# Patient Record
Sex: MALE | Race: WHITE | NOT HISPANIC OR LATINO | URBAN - METROPOLITAN AREA
[De-identification: names, ages, dates, MRNs, and addresses within clinical notes are randomized per-mention and may not be internally consistent; named-entity substitution may affect disease eponyms.]

---

## 2024-05-09 ENCOUNTER — OFFICE VISIT (OUTPATIENT)
Dept: PRIMARY CARE | Facility: CLINIC | Age: 64
End: 2024-05-09

## 2024-05-09 ENCOUNTER — LAB (OUTPATIENT)
Dept: LAB | Facility: LAB | Age: 64
End: 2024-05-09

## 2024-05-09 VITALS
SYSTOLIC BLOOD PRESSURE: 128 MMHG | HEIGHT: 73 IN | OXYGEN SATURATION: 97 % | HEART RATE: 82 BPM | DIASTOLIC BLOOD PRESSURE: 80 MMHG | BODY MASS INDEX: 27.3 KG/M2 | WEIGHT: 206 LBS

## 2024-05-09 DIAGNOSIS — E78.1 HYPERTRIGLYCERIDEMIA: Primary | ICD-10-CM

## 2024-05-09 DIAGNOSIS — R73.03 PREDIABETES: ICD-10-CM

## 2024-05-09 DIAGNOSIS — E55.9 VITAMIN D DEFICIENCY: ICD-10-CM

## 2024-05-09 DIAGNOSIS — F17.201 MODERATE TOBACCO USE DISORDER, IN SUSTAINED REMISSION: ICD-10-CM

## 2024-05-09 DIAGNOSIS — M54.9 CHRONIC BACK PAIN, UNSPECIFIED BACK LOCATION, UNSPECIFIED BACK PAIN LATERALITY: ICD-10-CM

## 2024-05-09 DIAGNOSIS — E03.9 HYPOTHYROIDISM, UNSPECIFIED TYPE: ICD-10-CM

## 2024-05-09 DIAGNOSIS — F41.9 ANXIETY: ICD-10-CM

## 2024-05-09 DIAGNOSIS — G89.29 CHRONIC BACK PAIN, UNSPECIFIED BACK LOCATION, UNSPECIFIED BACK PAIN LATERALITY: ICD-10-CM

## 2024-05-09 DIAGNOSIS — E78.2 MIXED HYPERLIPIDEMIA: ICD-10-CM

## 2024-05-09 DIAGNOSIS — I10 PRIMARY HYPERTENSION: Primary | ICD-10-CM

## 2024-05-09 DIAGNOSIS — I10 PRIMARY HYPERTENSION: ICD-10-CM

## 2024-05-09 LAB
ALBUMIN SERPL BCP-MCNC: 4.7 G/DL (ref 3.4–5)
ALP SERPL-CCNC: 54 U/L (ref 33–136)
ALT SERPL W P-5'-P-CCNC: 41 U/L (ref 10–52)
ANION GAP SERPL CALC-SCNC: 13 MMOL/L (ref 10–20)
AST SERPL W P-5'-P-CCNC: 25 U/L (ref 9–39)
BASOPHILS # BLD AUTO: 0.02 X10*3/UL (ref 0–0.1)
BASOPHILS NFR BLD AUTO: 0.3 %
BILIRUB SERPL-MCNC: 0.5 MG/DL (ref 0–1.2)
BUN SERPL-MCNC: 19 MG/DL (ref 6–23)
CALCIUM SERPL-MCNC: 9.7 MG/DL (ref 8.6–10.3)
CHLORIDE SERPL-SCNC: 101 MMOL/L (ref 98–107)
CHOLEST SERPL-MCNC: 235 MG/DL (ref 0–199)
CHOLESTEROL/HDL RATIO: 5.1
CO2 SERPL-SCNC: 29 MMOL/L (ref 21–32)
CREAT SERPL-MCNC: 0.84 MG/DL (ref 0.5–1.3)
EGFRCR SERPLBLD CKD-EPI 2021: >90 ML/MIN/1.73M*2
EOSINOPHIL # BLD AUTO: 0.11 X10*3/UL (ref 0–0.7)
EOSINOPHIL NFR BLD AUTO: 1.9 %
ERYTHROCYTE [DISTWIDTH] IN BLOOD BY AUTOMATED COUNT: 12 % (ref 11.5–14.5)
GLUCOSE SERPL-MCNC: 89 MG/DL (ref 74–99)
HCT VFR BLD AUTO: 44.5 % (ref 41–52)
HDLC SERPL-MCNC: 45.9 MG/DL
HGB BLD-MCNC: 15 G/DL (ref 13.5–17.5)
IMM GRANULOCYTES # BLD AUTO: 0.01 X10*3/UL (ref 0–0.7)
IMM GRANULOCYTES NFR BLD AUTO: 0.2 % (ref 0–0.9)
LDLC SERPL CALC-MCNC: ABNORMAL MG/DL
LYMPHOCYTES # BLD AUTO: 1.57 X10*3/UL (ref 1.2–4.8)
LYMPHOCYTES NFR BLD AUTO: 26.8 %
MCH RBC QN AUTO: 33.3 PG (ref 26–34)
MCHC RBC AUTO-ENTMCNC: 33.7 G/DL (ref 32–36)
MCV RBC AUTO: 99 FL (ref 80–100)
MONOCYTES # BLD AUTO: 0.57 X10*3/UL (ref 0.1–1)
MONOCYTES NFR BLD AUTO: 9.7 %
NEUTROPHILS # BLD AUTO: 3.57 X10*3/UL (ref 1.2–7.7)
NEUTROPHILS NFR BLD AUTO: 61.1 %
NON HDL CHOLESTEROL: 189 MG/DL (ref 0–149)
NRBC BLD-RTO: 0 /100 WBCS (ref 0–0)
PLATELET # BLD AUTO: 194 X10*3/UL (ref 150–450)
POTASSIUM SERPL-SCNC: 4.5 MMOL/L (ref 3.5–5.3)
PROT SERPL-MCNC: 6.9 G/DL (ref 6.4–8.2)
RBC # BLD AUTO: 4.51 X10*6/UL (ref 4.5–5.9)
SODIUM SERPL-SCNC: 138 MMOL/L (ref 136–145)
TRIGL SERPL-MCNC: 449 MG/DL (ref 0–149)
TSH SERPL-ACNC: 1.25 MIU/L (ref 0.44–3.98)
VLDL: ABNORMAL
WBC # BLD AUTO: 5.9 X10*3/UL (ref 4.4–11.3)

## 2024-05-09 PROCEDURE — 85025 COMPLETE CBC W/AUTO DIFF WBC: CPT

## 2024-05-09 PROCEDURE — 80061 LIPID PANEL: CPT

## 2024-05-09 PROCEDURE — 1036F TOBACCO NON-USER: CPT | Performed by: NURSE PRACTITIONER

## 2024-05-09 PROCEDURE — 36415 COLL VENOUS BLD VENIPUNCTURE: CPT

## 2024-05-09 PROCEDURE — 3074F SYST BP LT 130 MM HG: CPT | Performed by: NURSE PRACTITIONER

## 2024-05-09 PROCEDURE — 80053 COMPREHEN METABOLIC PANEL: CPT

## 2024-05-09 PROCEDURE — 83036 HEMOGLOBIN GLYCOSYLATED A1C: CPT

## 2024-05-09 PROCEDURE — 84443 ASSAY THYROID STIM HORMONE: CPT

## 2024-05-09 PROCEDURE — 99204 OFFICE O/P NEW MOD 45 MIN: CPT | Performed by: NURSE PRACTITIONER

## 2024-05-09 PROCEDURE — 82306 VITAMIN D 25 HYDROXY: CPT

## 2024-05-09 PROCEDURE — 3079F DIAST BP 80-89 MM HG: CPT | Performed by: NURSE PRACTITIONER

## 2024-05-09 RX ORDER — TRAMADOL HYDROCHLORIDE AND ACETAMINOPHEN 37.5; 325 MG/1; MG/1
1 TABLET, FILM COATED ORAL EVERY 6 HOURS PRN
COMMUNITY
Start: 2024-02-02

## 2024-05-09 RX ORDER — IBUPROFEN 600 MG/1
600 TABLET ORAL EVERY 6 HOURS PRN
COMMUNITY
Start: 2023-06-14

## 2024-05-09 RX ORDER — LEVOTHYROXINE SODIUM 88 UG/1
TABLET ORAL
Qty: 118 TABLET | Refills: 3 | Status: SHIPPED | OUTPATIENT
Start: 2024-05-09 | End: 2024-08-07

## 2024-05-09 RX ORDER — HYDROXYZINE HYDROCHLORIDE 25 MG/1
25 TABLET, FILM COATED ORAL 2 TIMES DAILY
Qty: 60 TABLET | Refills: 0 | Status: SHIPPED | OUTPATIENT
Start: 2024-05-09 | End: 2024-05-09

## 2024-05-09 RX ORDER — LISINOPRIL 20 MG/1
1 TABLET ORAL DAILY
COMMUNITY
Start: 2024-03-02 | End: 2024-05-09 | Stop reason: SDUPTHER

## 2024-05-09 RX ORDER — LEVOTHYROXINE SODIUM 88 UG/1
TABLET ORAL
COMMUNITY
Start: 2024-02-29 | End: 2024-05-09 | Stop reason: SDUPTHER

## 2024-05-09 RX ORDER — LISINOPRIL 20 MG/1
20 TABLET ORAL DAILY
Qty: 90 TABLET | Refills: 3 | Status: SHIPPED | OUTPATIENT
Start: 2024-05-09

## 2024-05-09 RX ORDER — HYDROXYZINE HYDROCHLORIDE 25 MG/1
25 TABLET, FILM COATED ORAL EVERY 12 HOURS PRN
Qty: 60 TABLET | Refills: 0 | Status: SHIPPED | OUTPATIENT
Start: 2024-05-09 | End: 2024-06-08

## 2024-05-09 ASSESSMENT — ENCOUNTER SYMPTOMS
SHORTNESS OF BREATH: 0
MYALGIAS: 0
CONSTIPATION: 0
COUGH: 0
JOINT SWELLING: 0
COLOR CHANGE: 0
EYE PAIN: 0
DIARRHEA: 0
ARTHRALGIAS: 1
NAUSEA: 0
CHILLS: 0
PALPITATIONS: 0
WEAKNESS: 0
FEVER: 0
ABDOMINAL PAIN: 0
ABDOMINAL DISTENTION: 0
FATIGUE: 0
BRUISES/BLEEDS EASILY: 0
DIZZINESS: 0
TROUBLE SWALLOWING: 0
DIFFICULTY URINATING: 0
WOUND: 0
HEADACHES: 0
WHEEZING: 0
BACK PAIN: 1
SEIZURES: 0
ADENOPATHY: 0

## 2024-05-09 ASSESSMENT — ANXIETY QUESTIONNAIRES
1. FEELING NERVOUS, ANXIOUS, OR ON EDGE: MORE THAN HALF THE DAYS
4. TROUBLE RELAXING: NEARLY EVERY DAY
7. FEELING AFRAID AS IF SOMETHING AWFUL MIGHT HAPPEN: MORE THAN HALF THE DAYS
2. NOT BEING ABLE TO STOP OR CONTROL WORRYING: MORE THAN HALF THE DAYS
GAD7 TOTAL SCORE: 14
3. WORRYING TOO MUCH ABOUT DIFFERENT THINGS: MORE THAN HALF THE DAYS
IF YOU CHECKED OFF ANY PROBLEMS ON THIS QUESTIONNAIRE, HOW DIFFICULT HAVE THESE PROBLEMS MADE IT FOR YOU TO DO YOUR WORK, TAKE CARE OF THINGS AT HOME, OR GET ALONG WITH OTHER PEOPLE: SOMEWHAT DIFFICULT
6. BECOMING EASILY ANNOYED OR IRRITABLE: MORE THAN HALF THE DAYS
5. BEING SO RESTLESS THAT IT IS HARD TO SIT STILL: SEVERAL DAYS

## 2024-05-09 ASSESSMENT — COLUMBIA-SUICIDE SEVERITY RATING SCALE - C-SSRS
6. HAVE YOU EVER DONE ANYTHING, STARTED TO DO ANYTHING, OR PREPARED TO DO ANYTHING TO END YOUR LIFE?: NO
2. HAVE YOU ACTUALLY HAD ANY THOUGHTS OF KILLING YOURSELF?: NO
1. IN THE PAST MONTH, HAVE YOU WISHED YOU WERE DEAD OR WISHED YOU COULD GO TO SLEEP AND NOT WAKE UP?: NO

## 2024-05-09 ASSESSMENT — PATIENT HEALTH QUESTIONNAIRE - PHQ9
SUM OF ALL RESPONSES TO PHQ9 QUESTIONS 1 AND 2: 0
1. LITTLE INTEREST OR PLEASURE IN DOING THINGS: NOT AT ALL
1. LITTLE INTEREST OR PLEASURE IN DOING THINGS: NOT AT ALL
10. IF YOU CHECKED OFF ANY PROBLEMS, HOW DIFFICULT HAVE THESE PROBLEMS MADE IT FOR YOU TO DO YOUR WORK, TAKE CARE OF THINGS AT HOME, OR GET ALONG WITH OTHER PEOPLE: NOT DIFFICULT AT ALL
SUM OF ALL RESPONSES TO PHQ9 QUESTIONS 1 AND 2: 1
2. FEELING DOWN, DEPRESSED OR HOPELESS: NOT AT ALL
2. FEELING DOWN, DEPRESSED OR HOPELESS: SEVERAL DAYS

## 2024-05-09 NOTE — ASSESSMENT & PLAN NOTE
Patient has questionable symptoms to elevated blood pressure at times? He is to continue current lisinopril dosing but also monitor his blood pressure routinely. Provider to be notified of any issues with persistent hypo or hyper tension

## 2024-05-09 NOTE — PROGRESS NOTES
"Subjective   Patient ID: Evan Wang is a 63 y.o. male who presents for Establish Care and Follow-up (Med review/).    Patient seen today in order to establish primary care.  Patient seen sitting up in room, appearing sad and overwhelmed.  Patient recently retired in January of this year from being a .  He also found out that month that his wife has oral cancer and she has had extensive treatments and surgeries for this.  Patient admits to increased anxiety over his wife's illness and pressures to take care of his family.  Patient has two children at home and is his wife's primary caregiver.  Patient states he has a good support system with other family members but that \"they can't do everything\". HORACIO 7 screening completed today and patient scored for moderate anxiety.  Patient denies any overt issues with depression but does admit to occasional insomnia.  He states that he occasionally smokes marijuana to help with his mood, but states that this is done relatively infrequently.  Patient is agreeable to trial as needed medications with anxiety as well as speak with a counselor.  Patient denies any issues with appetite or staying hydrated.  Occasional nocturia and reported but this typically less than twice at night.  No reported issue with bowel movements.  His most recent colonoscopy was completed in 2022.  Patient takes medication for blood pressure and does not monitor his vital signs at home.  He admits to occasional issues of blurred vision when he gets very upset.  He denies any associated shortness of breath or chest pain.  Patient no longer smokes tobacco but admits to drinking several beers a night.  He states that it is not an excessive amount.  Given patient's former tobacco use and history of being a , he would like imaging completed to assess for any underlying pulmonary pathology.  Patient admits to intermittent issues with lower back pain.  He states that this was a Worker's " "Compensation case and was previously receiving tramadol.  Patient states that he has some of this medication left and takes it very sparingly.  He also admits to occasional aches and pains in various joints if he is overdoing things.  Medications reviewed.  No other acute concerns voiced at this time.           Past Medical History:   Diagnosis Date    Hypertension         History reviewed. No pertinent surgical history.     Family History   Problem Relation Name Age of Onset    Stomach cancer Mother      Lung cancer Father          Review of Systems   Constitutional:  Negative for chills, fatigue and fever.   HENT:  Positive for hearing loss. Negative for dental problem and trouble swallowing.    Eyes:  Positive for visual disturbance. Negative for pain.        Positive for intermittent blurred vision   Respiratory:  Negative for cough, shortness of breath and wheezing.    Cardiovascular:  Negative for chest pain, palpitations and leg swelling.   Gastrointestinal:  Negative for abdominal distention, abdominal pain, constipation, diarrhea and nausea.   Endocrine: Negative for cold intolerance and heat intolerance.   Genitourinary:  Negative for difficulty urinating.        Positive for occasional nocturia   Musculoskeletal:  Positive for arthralgias and back pain. Negative for gait problem, joint swelling and myalgias.        Positive for polyarthralgias   Skin:  Negative for color change, pallor, rash and wound.   Allergic/Immunologic: Negative for environmental allergies and food allergies.   Neurological:  Negative for dizziness, seizures, weakness and headaches.   Hematological:  Negative for adenopathy. Does not bruise/bleed easily.   Psychiatric/Behavioral:  Negative for behavioral problems.         Positive for anxiety and occasional insomnia   All other systems reviewed and are negative.      Objective   /80   Pulse 82   Ht 1.854 m (6' 1\")   Wt 93.4 kg (206 lb)   SpO2 97%   BMI 27.18 kg/m² "     Physical Exam  Constitutional:       General: He is not in acute distress.     Appearance: He is not toxic-appearing.      Comments: Appears run down   HENT:      Head: Normocephalic and atraumatic.      Right Ear: Tympanic membrane, ear canal and external ear normal.      Left Ear: Tympanic membrane, ear canal and external ear normal.      Nose: Nose normal.      Mouth/Throat:      Mouth: Mucous membranes are moist.      Pharynx: Oropharynx is clear.   Eyes:      Extraocular Movements: Extraocular movements intact.      Conjunctiva/sclera: Conjunctivae normal.      Pupils: Pupils are equal, round, and reactive to light.   Cardiovascular:      Rate and Rhythm: Normal rate and regular rhythm.      Pulses: Normal pulses.      Heart sounds: Normal heart sounds. No murmur heard.  Pulmonary:      Effort: Pulmonary effort is normal.      Breath sounds: Normal breath sounds. No wheezing.   Abdominal:      General: Bowel sounds are normal.      Palpations: Abdomen is soft.   Musculoskeletal:         General: No swelling. Normal range of motion.      Cervical back: Normal range of motion and neck supple.   Skin:     General: Skin is warm and dry.   Neurological:      General: No focal deficit present.      Mental Status: He is alert. Mental status is at baseline.      Cranial Nerves: No cranial nerve deficit.      Motor: No weakness.   Psychiatric:         Behavior: Behavior normal.         Thought Content: Thought content normal.         Judgment: Judgment normal.      Comments: Appears sad and overwhelmed at today's visit         Assessment/Plan   Problem List Items Addressed This Visit             ICD-10-CM    Moderate tobacco use disorder, in sustained remission F17.201     Patient is a former smoker and former . He is agreeable to obtain a low dose CT Lung for screening purposes         Relevant Orders    CT lung screening low dose    Anxiety F41.9     Patient agreeable to trial PRN hydroxyzine. He is  also agreeable to speak with in office counselor due to continued issues with anxiety. Patient to notify provider for any persistent or worsening mood concerns         Relevant Medications    hydrOXYzine HCL (Atarax) 25 mg tablet    Other Relevant Orders    Follow Up In [facility]    Vitamin D deficiency E55.9    Relevant Orders    Vitamin D 25-Hydroxy,Total (for eval of Vitamin D levels)    Prediabetes R73.03     Lab Results   Component Value Date    HGBA1C 5.7 (H) 08/09/2023      A1C ordered today for monitoring purposes.         Relevant Orders    Hemoglobin A1C    Hypothyroidism E03.9     Patient continues on daily levothyroxine. TSH levels ordered today for monitoring purposes         Relevant Medications    levothyroxine (Synthroid, Levoxyl) 88 mcg tablet    Other Relevant Orders    Tsh With Reflex To Free T4 If Abnormal    Primary hypertension - Primary I10     Patient has questionable symptoms to elevated blood pressure at times? He is to continue current lisinopril dosing but also monitor his blood pressure routinely. Provider to be notified of any issues with persistent hypo or hyper tension         Relevant Medications    lisinopril 20 mg tablet    Other Relevant Orders    CBC and Auto Differential    Comprehensive Metabolic Panel    Lipid Panel    Chronic back pain M54.9, G89.29     Consider physical therapy for any flares.  Comfort measures and supportive care at this time.

## 2024-05-09 NOTE — ASSESSMENT & PLAN NOTE
Patient is a former smoker and former . He is agreeable to obtain a low dose CT Lung for screening purposes

## 2024-05-09 NOTE — ASSESSMENT & PLAN NOTE
Patient agreeable to trial PRN hydroxyzine. He is also agreeable to speak with in office counselor due to continued issues with anxiety. Patient to notify provider for any persistent or worsening mood concerns

## 2024-05-09 NOTE — ASSESSMENT & PLAN NOTE
Lab Results   Component Value Date    HGBA1C 5.7 (H) 08/09/2023      A1C ordered today for monitoring purposes.

## 2024-05-10 LAB
25(OH)D3 SERPL-MCNC: 28 NG/ML (ref 30–100)
EST. AVERAGE GLUCOSE BLD GHB EST-MCNC: 108 MG/DL
HBA1C MFR BLD: 5.4 %

## 2024-05-10 RX ORDER — ACETAMINOPHEN 500 MG
2000 TABLET ORAL DAILY
Qty: 90 CAPSULE | Refills: 3 | Status: SHIPPED | OUTPATIENT
Start: 2024-05-10 | End: 2025-05-10

## 2024-05-10 RX ORDER — ICOSAPENT ETHYL 1 G/1
2 CAPSULE ORAL
Qty: 360 CAPSULE | Refills: 3 | Status: SHIPPED | OUTPATIENT
Start: 2024-05-10 | End: 2025-05-10

## 2024-05-13 ENCOUNTER — TELEPHONE (OUTPATIENT)
Dept: PRIMARY CARE | Facility: CLINIC | Age: 64
End: 2024-05-13

## 2024-05-13 NOTE — TELEPHONE ENCOUNTER
Result Communication    Resulted Orders   CBC and Auto Differential   Result Value Ref Range    WBC 5.9 4.4 - 11.3 x10*3/uL    nRBC 0.0 0.0 - 0.0 /100 WBCs    RBC 4.51 4.50 - 5.90 x10*6/uL    Hemoglobin 15.0 13.5 - 17.5 g/dL    Hematocrit 44.5 41.0 - 52.0 %    MCV 99 80 - 100 fL    MCH 33.3 26.0 - 34.0 pg    MCHC 33.7 32.0 - 36.0 g/dL    RDW 12.0 11.5 - 14.5 %    Platelets 194 150 - 450 x10*3/uL    Neutrophils % 61.1 40.0 - 80.0 %    Immature Granulocytes %, Automated 0.2 0.0 - 0.9 %      Comment:      Immature Granulocyte Count (IG) includes promyelocytes, myelocytes and metamyelocytes but does not include bands. Percent differential counts (%) should be interpreted in the context of the absolute cell counts (cells/UL).    Lymphocytes % 26.8 13.0 - 44.0 %    Monocytes % 9.7 2.0 - 10.0 %    Eosinophils % 1.9 0.0 - 6.0 %    Basophils % 0.3 0.0 - 2.0 %    Neutrophils Absolute 3.57 1.20 - 7.70 x10*3/uL      Comment:      Percent differential counts (%) should be interpreted in the context of the absolute cell counts (cells/uL).    Immature Granulocytes Absolute, Automated 0.01 0.00 - 0.70 x10*3/uL    Lymphocytes Absolute 1.57 1.20 - 4.80 x10*3/uL    Monocytes Absolute 0.57 0.10 - 1.00 x10*3/uL    Eosinophils Absolute 0.11 0.00 - 0.70 x10*3/uL    Basophils Absolute 0.02 0.00 - 0.10 x10*3/uL   Comprehensive Metabolic Panel   Result Value Ref Range    Glucose 89 74 - 99 mg/dL    Sodium 138 136 - 145 mmol/L    Potassium 4.5 3.5 - 5.3 mmol/L    Chloride 101 98 - 107 mmol/L    Bicarbonate 29 21 - 32 mmol/L    Anion Gap 13 10 - 20 mmol/L    Urea Nitrogen 19 6 - 23 mg/dL    Creatinine 0.84 0.50 - 1.30 mg/dL    eGFR >90 >60 mL/min/1.73m*2      Comment:      Calculations of estimated GFR are performed using the 2021 CKD-EPI Study Refit equation without the race variable for the IDMS-Traceable creatinine methods.  https://jasn.asnjournals.org/content/early/2021/09/22/ASN.6699495011    Calcium 9.7 8.6 - 10.3 mg/dL    Albumin 4.7  3.4 - 5.0 g/dL    Alkaline Phosphatase 54 33 - 136 U/L    Total Protein 6.9 6.4 - 8.2 g/dL    AST 25 9 - 39 U/L    Bilirubin, Total 0.5 0.0 - 1.2 mg/dL    ALT 41 10 - 52 U/L      Comment:      Patients treated with Sulfasalazine may generate falsely decreased results for ALT.   Lipid Panel   Result Value Ref Range    Cholesterol 235 (H) 0 - 199 mg/dL      Comment:            Age      Desirable   Borderline High   High     0-19 Y     0 - 169       170 - 199     >/= 200    20-24 Y     0 - 189       190 - 224     >/= 225         >24 Y     0 - 199       200 - 239     >/= 240   **All ranges are based on fasting samples. Specific   therapeutic targets will vary based on patient-specific   cardiac risk.    Pediatric guidelines reference:Pediatrics 2011, 128(S5).Adult guidelines reference: NCEP ATPIII Guidelines,SON 2001, 258:2486-97    Venipuncture immediately after or during the administration of Metamizole may lead to falsely low results. Testing should be performed immediately prior to Metamizole dosing.    HDL-Cholesterol 45.9 mg/dL      Comment:        Age       Very Low   Low     Normal    High    0-19 Y    < 35      < 40     40-45     ----  20-24 Y    ----     < 40      >45      ----        >24 Y      ----     < 40     40-60      >60      Cholesterol/HDL Ratio 5.1       Comment:        Ref Values  Desirable  < 3.4  High Risk  > 5.0    LDL Calculated        Comment:      The calculation of LDL and VLDL are inaccurate when the Triglycerides are greater than 400 mg/dL or when the patient is non-fasting. If LDL measurement is necessary contact the testing laboratory for an alternative LDL assay.                                  Near   Borderline      AGE      Desirable  Optimal    High     High     Very High     0-19 Y     0 - 109     ---    110-129   >/= 130     ----    20-24 Y     0 - 119     ---    120-159   >/= 160     ----      >24 Y     0 -  99   100-129  130-159   160-189     >/=190      VLDL        Comment:       Unable to calculate VLDL.    Triglycerides 449 (H) 0 - 149 mg/dL      Comment:         Age         Desirable   Borderline High   High     Very High   0 D-90 D    19 - 174         ----         ----        ----  91 D- 9 Y     0 -  74        75 -  99     >/= 100      ----    10-19 Y     0 -  89        90 - 129     >/= 130      ----    20-24 Y     0 - 114       115 - 149     >/= 150      ----         >24 Y     0 - 149       150 - 199    200- 499    >/= 500    Venipuncture immediately after or during the administration of Metamizole may lead to falsely low results. Testing should be performed immediately prior to Metamizole dosing.    Non HDL Cholesterol 189 (H) 0 - 149 mg/dL      Comment:            Age       Desirable   Borderline High   High     Very High     0-19 Y     0 - 119       120 - 144     >/= 145    >/= 160    20-24 Y     0 - 149       150 - 189     >/= 190      ----         >24 Y    30 mg/dL above LDL Cholesterol goal     Hemoglobin A1C   Result Value Ref Range    Hemoglobin A1C 5.4 see below %    Estimated Average Glucose 108 Not Established mg/dL    Narrative    Diagnosis of Diabetes-Adults  Non-Diabetic: < or = 5.6%  Increased risk for developing diabetes: 5.7-6.4%  Diagnostic of diabetes: > or = 6.5%    Monitoring of Diabetes  Age (y)....................... Therapeutic Goal (%)  Adults: >18.........................<7.0  Pediatrics: 13-18...................<7.5  Pediatrics: 7-12....................<8.0  Pediatrics: 0-6..................... 7.5-8.5    American Diabetes Association. Diabetes Care 33(S1), Jan 2010       Vitamin D 25-Hydroxy,Total (for eval of Vitamin D levels)   Result Value Ref Range    Vitamin D, 25-Hydroxy, Total 28 (L) 30 - 100 ng/mL    Narrative    Deficiency:         < 20   ng/ml  Insufficiency:      20-29  ng/ml  Sufficiency:         ng/ml  This assay accurately quantifies the sum of Vitamin D3, 25-Hydroxy and Vitamin D2,25-Hydroxy.   Tsh With Reflex To Free T4 If Abnormal    Result Value Ref Range    Thyroid Stimulating Hormone 1.25 0.44 - 3.98 mIU/L    Narrative    TSH testing is performed using different testing methodology at [facility] than at other [facility] hospitals. Direct result comparisons should only be made within the same method.         11:17 AM      Results were successfully communicated with the patient and they acknowledged their understanding.

## 2024-05-13 NOTE — TELEPHONE ENCOUNTER
----- Message from HAVEN Werner sent at 5/10/2024 11:23 AM EDT -----  Can you please update patient to most recent lab results:  Most recent labs reviewed and found to be relatively stable. Your vitamin D levels were found to be insufficient so a daily vitamin D has been prescribed. There levels will continue to be monitored routinely. Additionally your cholesterol levels were found to be slightly elevated and your triglyceride levels were found to be moderately elevated. You have been prescribed a prescription strength Omega 3 to help bring your levels down. Please follow a low fat, low cholesterol diet and increase your activity as tolerated. Orders in place to recheck these levels in 3 months. Please notify the office if you have any additional questions or concerns.

## 2024-05-23 ENCOUNTER — TELEPHONE (OUTPATIENT)
Dept: PRIMARY CARE | Facility: CLINIC | Age: 64
End: 2024-05-23

## 2024-05-23 DIAGNOSIS — E78.1 HYPERTRIGLYCERIDEMIA: Primary | ICD-10-CM

## 2024-05-23 NOTE — TELEPHONE ENCOUNTER
Can you please update the patient that the Vascepa which was prescribed for his elevated triglyceride levels will not be covered by his insurance so an alternative medication has been sent to his pharmacy.  This should also help to decrease his triglyceride levels.  We will we continue to monitor his levels routinely.

## 2024-05-28 ENCOUNTER — APPOINTMENT (OUTPATIENT)
Dept: RADIOLOGY | Facility: HOSPITAL | Age: 64
End: 2024-05-28
Payer: COMMERCIAL

## 2024-06-03 ENCOUNTER — HOSPITAL ENCOUNTER (OUTPATIENT)
Dept: RADIOLOGY | Facility: HOSPITAL | Age: 64
Discharge: HOME | End: 2024-06-03
Payer: MEDICARE

## 2024-06-03 DIAGNOSIS — F17.201 MODERATE TOBACCO USE DISORDER, IN SUSTAINED REMISSION: ICD-10-CM

## 2024-06-03 PROCEDURE — 71271 CT THORAX LUNG CANCER SCR C-: CPT

## 2024-06-04 ENCOUNTER — SOCIAL WORK (OUTPATIENT)
Dept: PRIMARY CARE | Facility: CLINIC | Age: 64
End: 2024-06-04
Payer: MEDICARE

## 2024-06-06 ENCOUNTER — DOCUMENTATION (OUTPATIENT)
Dept: BEHAVIORAL HEALTH | Facility: CLINIC | Age: 64
End: 2024-06-06
Payer: MEDICARE

## 2024-06-06 ENCOUNTER — TELEPHONE (OUTPATIENT)
Dept: PRIMARY CARE | Facility: CLINIC | Age: 64
End: 2024-06-06
Payer: MEDICARE

## 2024-06-06 NOTE — TELEPHONE ENCOUNTER
Result Communication    Resulted Orders   CT lung screening low dose    Narrative    Interpreted By:  Min Pro,   STUDY:  CT LUNG SCREENING LOW DOSE;  6/3/2024 4:09 pm      INDICATION:  Signs/Symptoms:Lung Cancer Screening.      COMPARISON:  None.      ACCESSION NUMBER(S):  [id number]      ORDERING CLINICIAN:  DION JAFFE      TECHNIQUE:  Helical data acquisition of the chest was obtained without IV  contrast material.  Images were reformatted in axial, coronal, and  sagittal planes.      FINDINGS:  LUNGS AND AIRWAYS:  The trachea and central airways are patent. No endobronchial lesion  is seen.      There is mild bilateral upper lung predominant centrilobular and  paraseptal emphysema.There is no focal consolidation, pleural  effusion, or pneumothorax.      No suspicious pulmonary nodule      MEDIASTINUM AND OVI, LOWER NECK AND AXILLA:  The visualized thyroid gland is within normal limits.  No evidence of thoracic lymphadenopathy by CT criteria.  Esophagus appears within normal limits as seen.      HEART AND VESSELS:  The thoracic aorta normal in course and caliber.  Main pulmonary artery and its branches are normal in caliber.  Minimal coronary artery calcifications are seen. Please note, the  study is not optimized for evaluation of coronary arteries. The  cardiac chambers are not enlarged. There is no pericardial effusion  seen.      UPPER ABDOMEN:  Diffuse hepatic steatosis              CHEST WALL AND OSSEOUS STRUCTURES:  Chest wall is within normal limits.  No acute osseous pathology.There are no suspicious osseous lesions.        Impression    1. No suspicious pulmonary nodules identified. Continued screening  with low-dose noncontrast chest CT in 12 months (from current date)  is recommended.  2. Mild upper lung emphysema  3. Minimal coronary artery calcification.  4. Diffuse hepatic steatosis. Consider PCP evaluation.          LUNG RADS CATEGORY:  Lung Rad: Lung-RADS 1 (Negative)       Recommendation: Continue annual screening with Low Dose Chest CT in  12 months, recommended as per American College of Radiology  Guidelines Lung-RADS Version 2022.          MACRO:  None      Signed by: Min Padilla 6/4/2024 6:47 AM  Dictation workstation:   [id number]       6:11 PM      Results were successfully communicated with the patient and they acknowledged their understanding.

## 2024-06-06 NOTE — PROGRESS NOTES
[facility] Psychiatry Consult Note     Evan Wang is a 63 y.o. y.o., referred to [facility] for symptoms of depression and anxiety. I have reviewed the patient with the behavioral health manager and reviewed the patient's electronic record.    HORACIO - 9  PHQ- 5    Evan is a retired [employer] , retired in January after decades of work.  His wife was diagnosed with tongue cancer and is undergoing care.  This has been difficult, but she should pull through.  Evan has struggled more with sleep.  He has felt anxious for years, but with his wife's struggles he has been more anxious.  He tried hydroxyzine for sleep, but felt it knocked him out.  He denies any depression or SI.  He enjoys performing music.    They have two younger kids at home.  He had used alcohol more in the past, but denies current use.  Trauma is denied.  He has a brother who is supportive.  He has an adult son who has a complicated relationship with Evan's wife (step-mom).      Recommendations:   - Trial Lexapro 10mg daily for anxiety.  Common side effects include upset stomach and headache, but resolve after a few days on it.  Sexual side effects are somewhat common and dose change may be needed.    - Continue hydroxyzine for prn sleep, may take half tab if still too strong  - Patient will continue to follow with behavioral health case management.    The above treatment considerations and suggestions are based on consultations with the patient's care manager and a review of information available in the electronic medical record. I have not personally examined the patient. All recommendations should be implemented with consideration of the patient's relevant prior history and current clinical status. Please feel free to contact me with any questions about the care of this patient. I can be reached via the [facility] or Epic/[employer] messenger.

## 2024-06-06 NOTE — TELEPHONE ENCOUNTER
"----- Message from Camilo Porras DO sent at 6/4/2024  6:18 PM EDT -----  Please make sure patient is aware of the comments or MyChart message.    Your low-dose CT of the chest report for lung cancer screening describes \"no suspicious pulmonary nodules.\"  Recommend continued annual low-dose CT for lung cancer screening.  The radiologist also describes mild emphysema in the upper lung.    Also describes minimal coronary artery calcifications.  Recommend a heart healthy diet and lifestyle.  A statin might be considered.    Also describes fatty liver changes.    Please follow-up with Frances Yo CNP regarding these findings.  "

## 2024-06-07 ASSESSMENT — PATIENT HEALTH QUESTIONNAIRE - PHQ9
5. POOR APPETITE OR OVEREATING: NOT AT ALL
9. THOUGHTS THAT YOU WOULD BE BETTER OFF DEAD, OR OF HURTING YOURSELF: NOT AT ALL
8. MOVING OR SPEAKING SO SLOWLY THAT OTHER PEOPLE COULD HAVE NOTICED. OR THE OPPOSITE, BEING SO FIGETY OR RESTLESS THAT YOU HAVE BEEN MOVING AROUND A LOT MORE THAN USUAL: NOT AT ALL
6. FEELING BAD ABOUT YOURSELF - OR THAT YOU ARE A FAILURE OR HAVE LET YOURSELF OR YOUR FAMILY DOWN: NOT AT ALL
2. FEELING DOWN, DEPRESSED OR HOPELESS: NOT AT ALL
3. TROUBLE FALLING OR STAYING ASLEEP: NEARLY EVERY DAY
10. IF YOU CHECKED OFF ANY PROBLEMS, HOW DIFFICULT HAVE THESE PROBLEMS MADE IT FOR YOU TO DO YOUR WORK, TAKE CARE OF THINGS AT HOME, OR GET ALONG WITH OTHER PEOPLE: SOMEWHAT DIFFICULT
SUM OF ALL RESPONSES TO PHQ QUESTIONS 1-9: 5
4. FEELING TIRED OR HAVING LITTLE ENERGY: MORE THAN HALF THE DAYS
SUM OF ALL RESPONSES TO PHQ9 QUESTIONS 1 & 2: 0
1. LITTLE INTEREST OR PLEASURE IN DOING THINGS: NOT AT ALL
7. TROUBLE CONCENTRATING ON THINGS, SUCH AS READING THE NEWSPAPER OR WATCHING TELEVISION: NOT AT ALL

## 2024-06-07 ASSESSMENT — ANXIETY QUESTIONNAIRES
IF YOU CHECKED OFF ANY PROBLEMS ON THIS QUESTIONNAIRE, HOW DIFFICULT HAVE THESE PROBLEMS MADE IT FOR YOU TO DO YOUR WORK, TAKE CARE OF THINGS AT HOME, OR GET ALONG WITH OTHER PEOPLE: SOMEWHAT DIFFICULT
1. FEELING NERVOUS, ANXIOUS, OR ON EDGE: MORE THAN HALF THE DAYS
3. WORRYING TOO MUCH ABOUT DIFFERENT THINGS: MORE THAN HALF THE DAYS
GAD7 TOTAL SCORE: 9
5. BEING SO RESTLESS THAT IT IS HARD TO SIT STILL: SEVERAL DAYS
6. BECOMING EASILY ANNOYED OR IRRITABLE: SEVERAL DAYS
2. NOT BEING ABLE TO STOP OR CONTROL WORRYING: MORE THAN HALF THE DAYS
7. FEELING AFRAID AS IF SOMETHING AWFUL MIGHT HAPPEN: NOT AT ALL
4. TROUBLE RELAXING: SEVERAL DAYS

## 2024-06-07 NOTE — PROGRESS NOTES
"[facility] ([facility]) Initial Assessment    Session Time  Start: 4:00pm  End: 5:00pm     Collaborative Care program information (including case discussion with psychiatry, involvement of State mental health facility and billing when applicable) was provided and discussed with the patient. Patient Indicated understanding and agreed to proceed.   Confirm: Yes    Patient Health Questionnaire-9 Score: 5 (2024  3:26 PM)  HORACIO-7 Total Score: 9 (2024  3:27 PM)        Reason for Visit / Chief Complaint  Chief Complaint   Patient presents with    Depression     Depression with anxiety   Evan Wang is a 63 year old male presenting to [facility] ([facility]) after having been referred by his PCP. He has been experiencing increased anxiety over the past 6 months secondary to retiring (33 years as a [employer] ) and his wife almost immediately after being diagnosed with cancer. She has had surgery, chemotherapy and radiation, and is still struggling with the effects of this. Patient has a 14 year old and a nine year old son with his (second) wife, and an adult son in [place] with his first wife. Patient has expressed a great deal of anxiety related to the last several years of his employment, which he found difficult due to they types of people with whom he worked. He states openly that he has PTSD and an addictive personality. He reports past use of alcohol to excess, reports it is much less now but does not give specific information. He feels that he has built up a great deal of rage and resentment towards former colleagues that he is unable to let go of. He reports feeling \"stuck\" and that THC helps to some degree. Bill also c/o sleep issues, often waking up at around 3:00am and being unable to fall back to sleep. He gave limited information on family of origin, stating that he has issues with his mother (both parents now ), has 2 brothers, one of whom he feels is supportive. He states that he feels \"fury\" that " he is unable to let go of, and reports that he has always had anxiety but was never treated for it in any way.     Accompanied by: Self  Guardian Status: Self  Caregiver Status: Does not have a caregiver    Review of Symptoms    Sleep   Average Hours Sleep in/Night: 4  Prepares Self for Sleep at Time: Variable, wakes up in middle of night and cannot fall back asleep  Usual Wake up Time: Variable  Sleep Symptoms: interrupted sleep and awakes 1-2 x night  Sleep Hygiene: fair sleep hygiene    Mood   Symptom Onset/Duration: Last 8-10 months  Current Sx: trouble staying asleep, feeling tired/little energy, and trouble concentrating  Triggers: situation(s) and people  Past Sx: None, denied    Anxiety   Symptom Onset/Duration: Most days in 2+ years  Current Sx: feeling nervous/anxious/on edge, difficulty stopping/controlling worry, trouble relaxing, feeling fidgety/restless, easily annoyed/irritable, trouble concentrating, racing thoughts, unhelpful thinking patterns, and rumination  Panic / Somatic Sx: none  Triggers: situation(s) and people  Past Sx: feeling nervous/anxious/on edge, difficulty stopping/controlling worry, worrying too much, trouble relaxing, feeling fidgety/restless, easily annoyed/irritable, racing thoughts, and unhelpful thinking patterns    Self-Esteem / Self-Image   Self Esteem Rating (1-10 Scale, 10 being high): Did not assess  Self-Esteem / Self Image Sx: able to identify strength and feels has good qualities    Appetite   Description of Overall Appetite: denied any concerns  Eating Behaviors: eats balanced meals  Concerns with appetite: none, denied    Anger / Irritability  Symptoms of Anger / Irritability: internalized anger and verbal anger     Communication / Self Expression  Communication Style & Concerns: Need more time to assess    Trauma    Symptoms Onset/Duration: symptoms more than one month  Traumatic Experiences:  Wife going through cancer treatments   Current Symptoms Related to  "Traumatic Experience: problems sleeping, angry, and irritable  Triggers:     Grief / Loss / Adjustment   Symptom Onset/Duration: more than 6 months  Current Sx: feeling emotionally overwhelmed, anxious mood, and changes/problems with sleep  Factors of Grief / Loss / Adjustment: loss of health (of wife)    Hallucinations / Delusions   Hallucinations & Delusions Experienced: none, denied    Learning Concerns / Memory   Learning Concerns & Sx: none, denied  Memory Concerns & Sx: none, denied    Functional impairment   Impacting ADL's: no impairment   Impacting IADL's: No impairment  Impacting Ability : to relax, to sleep, and in relationship with others    Associated Medical Concerns   Potential Associated Factors: None      Comprehensive Behavioral Health History     Medications  Current Mental Health Medications:   None    Past Mental Health Medications:   Prescribed hydroxyzine very recently by PCP, states it \"knocked him out\" and he feared he would not be able to be responsive to his wife so will not take it again    Concerns / challenges / barriers with taking medications?     Open to medication recommendations from consulting psychiatrist? Y    Do you ever forget to take your medication?  If yes, how often?     Mental Health Treatment History  Mental Health Treatment: None  Reason/When/Where/Outcome:     Risk History  Suicidal Thoughts/Method/Intent/Plan: None, denied  Suicide Attempts/Preparations: None, denied  Number of Suicide Attempts: 0  Access to Firearms/Lethal Means: No guns in home  Non-Suicidal Self Injury: None, denied  Last Woodson Risk Score:    Protective Factors: good protective factors    Violence: None, denied  Homicidal Thoughts/Method/Plan/Intent: None, denied  Homicidal Attempts/Preparations: None, denied  Number of Attempts: 0      Substance Use History    Substances    Social History     Substance and Sexual Activity   Alcohol Use Yes    Alcohol/week: 6.0 standard drinks of alcohol    " Types: 6 Cans of beer per week     Social History     Substance and Sexual Activity   Drug Use Yes    Types: Marijuana       Substance Current Use   Alcohol Occasional use (heavy use in past)   Marijuana Daily               Addiction Treatment     Types of Addiction Treatment: None  Currently Sober? No (still drinks moderately    Status/Length of Sobriety:     Family History    Mental Health / Conditions    Family Member Condition / Diagnosis Medications / Side Effects                         Substance Use    Family Member Substance Current Use                           History of Suicide    Family Member Details               Social History    Housing   Living Situation: lives with spouse and family  Safe Housing Conditions / Feels Safe in Home: Yes    Employment  Current Employment:  Retired [employer]  (33 years)  Current Concerns/Challenges: No    Income   Current Concerns/Challenges: Yes, describe: Financial stress  Receive Benefits/Assistance: No    Education   Status / Level of Education: Some college    Legal   Legal Considerations: None, denied    Relationships   S/O:  Close, but wife is undergoing cancer treatments  Parents/Guardian: n/a  Siblings: Close with one brother  Friends: Denies, feels socially isolated  Other: 38 year old son in [place], patient somewhat upset that son has not reached out in any way since learning of step-mothers cancer diagnosis       Active Duty? No  Are you a ? No  Branch Area:   Were you in combat?  Discharge Status:   Do you receive VA Benefits:     Sexuality / Gender   Concerns with Sexuality/Gender: None, denied  Sexual Orientation: heterosexual    Preferred Gender Pronouns / Identity: He/him/his    Transportation   Transportation Concerns: None, denied    Tenriism/ Spirituality   Are you Worship or Spiritual: No  Tenriism / Practice: Atheist  Spiritual Practice: None, denied    Coping / Strengths / Supports   Coping:  No coping  skills  Strengths:   Supports: Brother      Abuse History  Physical Abuse: No  Sexual Abuse: No  Verbal / Emotional Abuse / Bullying (+Cyber): No   Financial Abuse: No  Domestic Violence: No    Assessment Summary  / Plan    Assessment Summary:  What do you want to work on/get out of collaborative care? Improved sleep, coping skills    Plan:   Psych consult - ongoing and set meeting frequency    No follow-ups on file.    Provisional Findings / Impressions  Primary: Anxiety NOS    Secondary: Deferred    Goals    Care Plan    There is no care plan documentation to display.

## 2024-06-25 ENCOUNTER — APPOINTMENT (OUTPATIENT)
Dept: PRIMARY CARE | Facility: CLINIC | Age: 64
End: 2024-06-25
Payer: MEDICARE

## 2024-06-30 PROCEDURE — 99492 1ST PSYC COLLAB CARE MGMT: CPT | Performed by: NURSE PRACTITIONER

## 2024-07-10 ENCOUNTER — DOCUMENTATION (OUTPATIENT)
Dept: PRIMARY CARE | Facility: CLINIC | Age: 64
End: 2024-07-10
Payer: MEDICARE

## 2024-07-10 DIAGNOSIS — F41.9 ANXIETY: Primary | ICD-10-CM

## 2024-07-12 ENCOUNTER — APPOINTMENT (OUTPATIENT)
Dept: PRIMARY CARE | Facility: CLINIC | Age: 64
End: 2024-07-12
Payer: MEDICARE

## 2024-10-11 ENCOUNTER — HOSPITAL ENCOUNTER (EMERGENCY)
Facility: HOSPITAL | Age: 64
Discharge: HOME | End: 2024-10-12
Attending: STUDENT IN AN ORGANIZED HEALTH CARE EDUCATION/TRAINING PROGRAM
Payer: MEDICARE

## 2024-10-11 DIAGNOSIS — S05.02XA ABRASION OF LEFT CORNEA, INITIAL ENCOUNTER: Primary | ICD-10-CM

## 2024-10-11 DIAGNOSIS — T15.92XA FOREIGN BODY OF LEFT EYE, INITIAL ENCOUNTER: ICD-10-CM

## 2024-10-11 PROCEDURE — 99283 EMERGENCY DEPT VISIT LOW MDM: CPT

## 2024-10-11 PROCEDURE — 65220 REMOVE FOREIGN BODY FROM EYE: CPT | Mod: LT

## 2024-10-11 RX ORDER — TETRACAINE HYDROCHLORIDE 5 MG/ML
1 SOLUTION OPHTHALMIC ONCE
Status: COMPLETED | OUTPATIENT
Start: 2024-10-11 | End: 2024-10-12

## 2024-10-11 ASSESSMENT — COLUMBIA-SUICIDE SEVERITY RATING SCALE - C-SSRS
6. HAVE YOU EVER DONE ANYTHING, STARTED TO DO ANYTHING, OR PREPARED TO DO ANYTHING TO END YOUR LIFE?: NO
1. IN THE PAST MONTH, HAVE YOU WISHED YOU WERE DEAD OR WISHED YOU COULD GO TO SLEEP AND NOT WAKE UP?: NO
2. HAVE YOU ACTUALLY HAD ANY THOUGHTS OF KILLING YOURSELF?: NO

## 2024-10-11 ASSESSMENT — PAIN DESCRIPTION - ORIENTATION: ORIENTATION: LEFT

## 2024-10-11 ASSESSMENT — LIFESTYLE VARIABLES
TOTAL SCORE: 0
EVER HAD A DRINK FIRST THING IN THE MORNING TO STEADY YOUR NERVES TO GET RID OF A HANGOVER: NO
HAVE YOU EVER FELT YOU SHOULD CUT DOWN ON YOUR DRINKING: NO
EVER FELT BAD OR GUILTY ABOUT YOUR DRINKING: NO
HAVE PEOPLE ANNOYED YOU BY CRITICIZING YOUR DRINKING: NO

## 2024-10-11 ASSESSMENT — PAIN SCALES - GENERAL: PAINLEVEL_OUTOF10: 5 - MODERATE PAIN

## 2024-10-11 ASSESSMENT — PAIN - FUNCTIONAL ASSESSMENT: PAIN_FUNCTIONAL_ASSESSMENT: 0-10

## 2024-10-11 ASSESSMENT — PAIN DESCRIPTION - LOCATION: LOCATION: EYE

## 2024-10-12 VITALS
WEIGHT: 200 LBS | TEMPERATURE: 99.3 F | RESPIRATION RATE: 18 BRPM | HEIGHT: 73 IN | BODY MASS INDEX: 26.51 KG/M2 | HEART RATE: 73 BPM | DIASTOLIC BLOOD PRESSURE: 85 MMHG | OXYGEN SATURATION: 97 % | SYSTOLIC BLOOD PRESSURE: 154 MMHG

## 2024-10-12 PROCEDURE — 2500000001 HC RX 250 WO HCPCS SELF ADMINISTERED DRUGS (ALT 637 FOR MEDICARE OP): Performed by: STUDENT IN AN ORGANIZED HEALTH CARE EDUCATION/TRAINING PROGRAM

## 2024-10-12 RX ORDER — ERYTHROMYCIN 5 MG/G
1.25 OINTMENT OPHTHALMIC ONCE
Status: COMPLETED | OUTPATIENT
Start: 2024-10-12 | End: 2024-10-12

## 2024-10-12 RX ORDER — ERYTHROMYCIN 5 MG/G
OINTMENT OPHTHALMIC EVERY 6 HOURS
Qty: 3.5 G | Refills: 0 | Status: SHIPPED | OUTPATIENT
Start: 2024-10-12 | End: 2024-10-19

## 2024-10-12 ASSESSMENT — PAIN SCALES - GENERAL: PAINLEVEL_OUTOF10: 2

## 2024-10-12 ASSESSMENT — PAIN - FUNCTIONAL ASSESSMENT: PAIN_FUNCTIONAL_ASSESSMENT: 0-10
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
## 2024-10-25 ENCOUNTER — APPOINTMENT (OUTPATIENT)
Dept: RADIOLOGY | Facility: HOSPITAL | Age: 64
End: 2024-10-25
Payer: MEDICARE

## 2024-10-25 ENCOUNTER — HOSPITAL ENCOUNTER (EMERGENCY)
Facility: HOSPITAL | Age: 64
Discharge: HOME | End: 2024-10-25
Payer: MEDICARE

## 2024-10-25 VITALS
TEMPERATURE: 98.3 F | BODY MASS INDEX: 27.47 KG/M2 | SYSTOLIC BLOOD PRESSURE: 140 MMHG | WEIGHT: 207.23 LBS | DIASTOLIC BLOOD PRESSURE: 99 MMHG | HEIGHT: 73 IN | HEART RATE: 77 BPM | OXYGEN SATURATION: 97 % | RESPIRATION RATE: 16 BRPM

## 2024-10-25 DIAGNOSIS — M79.672 LEFT FOOT PAIN: Primary | ICD-10-CM

## 2024-10-25 PROCEDURE — 99283 EMERGENCY DEPT VISIT LOW MDM: CPT

## 2024-10-25 PROCEDURE — 73630 X-RAY EXAM OF FOOT: CPT | Mod: LT

## 2024-10-25 PROCEDURE — 73630 X-RAY EXAM OF FOOT: CPT | Mod: LEFT SIDE | Performed by: RADIOLOGY

## 2024-10-25 PROCEDURE — 2500000001 HC RX 250 WO HCPCS SELF ADMINISTERED DRUGS (ALT 637 FOR MEDICARE OP): Performed by: PHYSICIAN ASSISTANT

## 2024-10-25 RX ORDER — ACETAMINOPHEN 325 MG/1
975 TABLET ORAL ONCE
Status: COMPLETED | OUTPATIENT
Start: 2024-10-25 | End: 2024-10-25

## 2024-10-25 ASSESSMENT — PAIN SCALES - GENERAL
PAINLEVEL_OUTOF10: 6
PAINLEVEL_OUTOF10: 7
PAINLEVEL_OUTOF10: 7

## 2024-10-25 ASSESSMENT — PAIN DESCRIPTION - PAIN TYPE
TYPE: ACUTE PAIN
TYPE: ACUTE PAIN

## 2024-10-25 ASSESSMENT — PAIN DESCRIPTION - PROGRESSION: CLINICAL_PROGRESSION: GRADUALLY WORSENING

## 2024-10-25 ASSESSMENT — PAIN DESCRIPTION - LOCATION
LOCATION: FOOT
LOCATION: FOOT

## 2024-10-25 ASSESSMENT — PAIN DESCRIPTION - DESCRIPTORS: DESCRIPTORS: ACHING;THROBBING

## 2024-10-25 ASSESSMENT — COLUMBIA-SUICIDE SEVERITY RATING SCALE - C-SSRS
1. IN THE PAST MONTH, HAVE YOU WISHED YOU WERE DEAD OR WISHED YOU COULD GO TO SLEEP AND NOT WAKE UP?: NO
2. HAVE YOU ACTUALLY HAD ANY THOUGHTS OF KILLING YOURSELF?: NO
6. HAVE YOU EVER DONE ANYTHING, STARTED TO DO ANYTHING, OR PREPARED TO DO ANYTHING TO END YOUR LIFE?: NO

## 2024-10-25 ASSESSMENT — PAIN DESCRIPTION - FREQUENCY: FREQUENCY: CONSTANT/CONTINUOUS

## 2024-10-25 ASSESSMENT — PAIN - FUNCTIONAL ASSESSMENT
PAIN_FUNCTIONAL_ASSESSMENT: 0-10
PAIN_FUNCTIONAL_ASSESSMENT: 0-10

## 2024-10-25 ASSESSMENT — PAIN DESCRIPTION - ORIENTATION
ORIENTATION: LEFT
ORIENTATION: LEFT

## 2024-10-25 ASSESSMENT — PAIN DESCRIPTION - ONSET: ONSET: ONGOING

## 2024-10-25 NOTE — ED PROVIDER NOTES
HPI   Chief Complaint   Patient presents with    Foot Injury     Pt was pushing his UTV 2 days ago and pushed it over his left foot. Pt still having pain in foot and some difficulty walking.       HPI    Patient is a 64-year-old male presenting for evaluation of left foot pain after suffering an injury 2 days ago.  Patient states he was pushing his UTV and accidentally ran it over his left foot.  He states that he was up climbing ladders yesterday and was having no pain until the pain developed sometime last night.  He describes it as an aching throbbing pain that is worse with weightbearing.  He denies bruising or swelling to the foot.  He states he has previously broken this foot in the past.  He denies any focal numbness or tingling.  No weakness.  No injury to the ankle or knee.  He denies falls.  He denies trauma to the head or neck.    Patient History   Past Medical History:   Diagnosis Date    Hypertension      No past surgical history on file.  Family History   Problem Relation Name Age of Onset    Stomach cancer Mother      Lung cancer Father       Social History     Tobacco Use    Smoking status: Former     Types: Cigarettes     Start date:      Quit date:      Years since quittin.    Smokeless tobacco: Never   Vaping Use    Vaping status: Never Used   Substance Use Topics    Alcohol use: Yes     Alcohol/week: 6.0 standard drinks of alcohol     Types: 6 Cans of beer per week    Drug use: Yes     Types: Marijuana       Physical Exam   ED Triage Vitals [10/25/24 1939]   Temperature Heart Rate Respirations BP   36.8 °C (98.3 °F) 77 16 (!) 140/99      Pulse Ox Temp Source Heart Rate Source Patient Position   97 % Temporal Monitor Sitting      BP Location FiO2 (%)     Left arm --       Physical Exam  Vitals and nursing note reviewed.   Constitutional:       Appearance: Normal appearance.   HENT:      Head: Normocephalic and atraumatic.   Eyes:      Extraocular Movements: Extraocular movements  intact.      Conjunctiva/sclera: Conjunctivae normal.      Pupils: Pupils are equal, round, and reactive to light.   Cardiovascular:      Rate and Rhythm: Normal rate and regular rhythm.   Pulmonary:      Effort: Pulmonary effort is normal.      Breath sounds: Normal breath sounds.   Musculoskeletal:         General: No swelling or deformity. Normal range of motion.      Left lower leg: No edema.      Comments: Minor tenderness to palpation over the left dorsal foot near the base of the right great toe.  No ecchymosis or edema.  No break in skin.  Patient has full range of motion of the left foot and ankle.  Distal pulses are palpable.   Neurological:      Mental Status: He is alert.           ED Course & MDM   Diagnoses as of 10/26/24 1009   Left foot pain                 No data recorded                                 Medical Decision Making  Parts of this chart have been completed using voice recognition software. Please excuse any errors of transcription. Despite the medical decision making time stamp above-my medical decision making has taken place during the patient's entire visit. My thought process and reason for plan has been formulated from the time that I saw the patient until the time of disposition and is not specific to one specific moment during their visit and furthermore my MDM encompasses this entire chart and not only this text box.      HPI: Detailed above.    Exam: A medically appropriate exam performed, outlined above, given the known history and presentation.    History obtained from: Patient    Social Determinants of Health considered during this visit: From home    EKG interpreted by my attending physician, reviewed by myself.    XR foot left 3+ views    (Results Pending)     Differential diagnoses considered for this visit include: Fracture versus contusion    Considerations/further MDM:    Patient is a 64-year-old male presenting for evaluation of left foot pain after running over his  foot with a UTV.  Vitals are hemodynamically stable within normal limits.  X-ray of the left foot was ordered for diagnostic imaging.    Patient was handed off to my attending physician on my departure from the emergency department.  See supervisory note for further detail and final disposition.    Patient was seen in conjunction with attending physician Dr. Greg Ambrosio   Patient's history, physical exam, diagnostic studies, and treatment plan were discussed thoroughly.    Procedure  Procedures     Christina Flores PA-C  10/26/24 1010
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
## 2024-10-26 NOTE — DISCHARGE INSTRUCTIONS
You are seen today for foot pain, your x-ray does not show any acute fractures or abnormalities, please follow-up with your primary care provider, I will also give you a referral to a foot doctor, please return the ER for worsening symptoms.    Thank you for choosing [facility] Emergency Department. It was my pleasure to be involved in your care today.         As of today's visit, based on reasonable likelihood, that it is safe for you to be discharged back to your residence to follow-up as an outpatient for ongoing management of your medical problem. You should follow-up with any referrals / primary provider as soon as possible. The contacts (number, addresses) are listed below.         Important:  Even though we think it is safe for you to go home, there is always a small chance that we are missing something that could require hospitalization.  Therefore it is very important that if you get worse or develops any new symptoms that you return here as soon as possible to be re-evaluated.  This includes return of symptoms that have resolved such as fainting, chest pain, or symptoms that could be warning signs for stroke important:  Even though we think it is safe for you to go home, there is always a small chance that we are missing something that could require hospitalization.  Therefore it is very important that if you get worse or develops any new symptoms that you return here as soon as possible to be re-evaluated.  This includes return of symptoms that have resolved such as fainting, chest pain, or symptoms that could be warning signs for stroke         Make sure your pharmacy and primary doctor is aware of any new medications prescribed today.          It is your responsibility to contact as soon as possible, and follow through with, any referrals you were given today. We do recommend you inform them you are a [facility] ER follow-up patient, as often they can better accommodate your need to be seen, provided  their schedules allow. We will, and have, made every effort to ensure you have access to adequate follow-up specialists available.          All problems may not be able to be fixed in one ER visit. This is why timely ongoing care is important, and this is a responsibility you share in. Further, you are free to follow up with any provider you choose, and this is not limited to our suggestion.          If cultures were obtained today, you will be contacted should anything result that would require further treatment. Please contact the ED at the number provided with questions.          Having trouble affording medications? Try [email or web address]! (This is not a hospital endorsed website, merely a recommendation based on my own personal experiences with [employer])

## 2024-11-08 ENCOUNTER — APPOINTMENT (OUTPATIENT)
Dept: PRIMARY CARE | Facility: CLINIC | Age: 64
End: 2024-11-08
Payer: MEDICARE

## 2024-11-12 PROBLEM — R94.39 ABNORMAL STRESS ECHO: Status: ACTIVE | Noted: 2019-10-16

## 2024-11-12 PROBLEM — S93.336A DISLOCATION OF METATARSAL JOINT: Status: ACTIVE | Noted: 2017-03-03

## 2024-11-12 PROBLEM — R73.9 ELEVATED BLOOD SUGAR: Status: ACTIVE | Noted: 2023-04-25

## 2024-11-12 PROBLEM — M20.40 HAMMER TOE: Status: ACTIVE | Noted: 2017-04-24

## 2024-11-12 PROBLEM — K63.5 SERRATED POLYP OF COLON: Status: ACTIVE | Noted: 2021-03-13

## 2024-11-12 PROBLEM — R07.89 CHEST DISCOMFORT: Status: ACTIVE | Noted: 2019-10-02

## 2024-11-12 PROBLEM — E78.2 MIXED HYPERLIPIDEMIA: Status: ACTIVE | Noted: 2024-11-12

## 2024-11-12 PROBLEM — L30.9 DERMATITIS: Status: RESOLVED | Noted: 2018-01-08 | Resolved: 2024-11-12

## 2024-11-12 PROBLEM — K62.5 RECTAL BLEEDING: Status: ACTIVE | Noted: 2019-08-30

## 2024-11-12 PROBLEM — I25.10 CORONARY ARTERY DISEASE WITHOUT ANGINA PECTORIS: Status: ACTIVE | Noted: 2021-03-13

## 2024-11-12 PROBLEM — J34.2 DEVIATED NASAL SEPTUM: Status: ACTIVE | Noted: 2024-11-12

## 2024-11-12 PROBLEM — Z86.0100 HISTORY OF COLONIC POLYPS: Status: ACTIVE | Noted: 2019-08-30

## 2024-11-12 PROBLEM — K76.0 FATTY METAMORPHOSIS OF LIVER: Status: ACTIVE | Noted: 2017-01-16

## 2024-11-12 PROBLEM — T59.91XA: Status: ACTIVE | Noted: 2023-04-25

## 2024-11-12 PROBLEM — M77.52 CAPSULITIS OF METATARSOPHALANGEAL (MTP) JOINT OF LEFT FOOT: Status: ACTIVE | Noted: 2017-03-03

## 2024-11-12 PROBLEM — R53.83 FATIGUE: Status: ACTIVE | Noted: 2018-05-08

## 2024-11-14 ENCOUNTER — APPOINTMENT (OUTPATIENT)
Dept: PRIMARY CARE | Facility: CLINIC | Age: 64
End: 2024-11-14
Payer: MEDICARE

## 2024-11-15 ENCOUNTER — APPOINTMENT (OUTPATIENT)
Dept: PRIMARY CARE | Facility: CLINIC | Age: 64
End: 2024-11-15
Payer: MEDICARE

## 2024-11-15 VITALS
SYSTOLIC BLOOD PRESSURE: 126 MMHG | TEMPERATURE: 97.4 F | HEIGHT: 73 IN | BODY MASS INDEX: 27.04 KG/M2 | HEART RATE: 78 BPM | WEIGHT: 204 LBS | OXYGEN SATURATION: 98 % | DIASTOLIC BLOOD PRESSURE: 78 MMHG

## 2024-11-15 DIAGNOSIS — M25.50 POLYARTHRALGIA: ICD-10-CM

## 2024-11-15 DIAGNOSIS — Z00.00 ROUTINE ADULT HEALTH MAINTENANCE: Primary | ICD-10-CM

## 2024-11-15 DIAGNOSIS — I10 PRIMARY HYPERTENSION: ICD-10-CM

## 2024-11-15 DIAGNOSIS — I25.10 CORONARY ARTERY DISEASE INVOLVING NATIVE CORONARY ARTERY OF NATIVE HEART WITHOUT ANGINA PECTORIS: ICD-10-CM

## 2024-11-15 DIAGNOSIS — E78.2 MIXED HYPERLIPIDEMIA: ICD-10-CM

## 2024-11-15 DIAGNOSIS — E55.9 VITAMIN D DEFICIENCY: ICD-10-CM

## 2024-11-15 DIAGNOSIS — F17.201 MODERATE TOBACCO USE DISORDER, IN SUSTAINED REMISSION: ICD-10-CM

## 2024-11-15 DIAGNOSIS — R73.03 PREDIABETES: ICD-10-CM

## 2024-11-15 DIAGNOSIS — E03.9 HYPOTHYROIDISM, UNSPECIFIED TYPE: ICD-10-CM

## 2024-11-15 DIAGNOSIS — F41.9 ANXIETY: ICD-10-CM

## 2024-11-15 DIAGNOSIS — Z00.00 ROUTINE GENERAL MEDICAL EXAMINATION AT HEALTH CARE FACILITY: ICD-10-CM

## 2024-11-15 PROBLEM — R73.9 ELEVATED BLOOD SUGAR: Status: RESOLVED | Noted: 2023-04-25 | Resolved: 2024-11-15

## 2024-11-15 PROCEDURE — 3008F BODY MASS INDEX DOCD: CPT | Performed by: NURSE PRACTITIONER

## 2024-11-15 PROCEDURE — 93000 ELECTROCARDIOGRAM COMPLETE: CPT | Performed by: NURSE PRACTITIONER

## 2024-11-15 PROCEDURE — G0402 INITIAL PREVENTIVE EXAM: HCPCS | Performed by: NURSE PRACTITIONER

## 2024-11-15 PROCEDURE — 99214 OFFICE O/P EST MOD 30 MIN: CPT | Performed by: NURSE PRACTITIONER

## 2024-11-15 PROCEDURE — 1160F RVW MEDS BY RX/DR IN RCRD: CPT | Performed by: NURSE PRACTITIONER

## 2024-11-15 PROCEDURE — 1124F ACP DISCUSS-NO DSCNMKR DOCD: CPT | Performed by: NURSE PRACTITIONER

## 2024-11-15 PROCEDURE — 1159F MED LIST DOCD IN RCRD: CPT | Performed by: NURSE PRACTITIONER

## 2024-11-15 PROCEDURE — 3074F SYST BP LT 130 MM HG: CPT | Performed by: NURSE PRACTITIONER

## 2024-11-15 PROCEDURE — 1170F FXNL STATUS ASSESSED: CPT | Performed by: NURSE PRACTITIONER

## 2024-11-15 PROCEDURE — 3078F DIAST BP <80 MM HG: CPT | Performed by: NURSE PRACTITIONER

## 2024-11-15 ASSESSMENT — ENCOUNTER SYMPTOMS
DEPRESSION: 0
MYALGIAS: 0
ABDOMINAL DISTENTION: 0
EYE PAIN: 0
DIARRHEA: 0
ABDOMINAL PAIN: 0
SHORTNESS OF BREATH: 0
ADENOPATHY: 0
SEIZURES: 0
FATIGUE: 0
WOUND: 0
BRUISES/BLEEDS EASILY: 0
COUGH: 0
WHEEZING: 0
NAUSEA: 0
ARTHRALGIAS: 1
LOSS OF SENSATION IN FEET: 0
CONSTIPATION: 0
FEVER: 0
PALPITATIONS: 0
DIZZINESS: 1
TROUBLE SWALLOWING: 0
BACK PAIN: 1
WEAKNESS: 0
DIFFICULTY URINATING: 0
COLOR CHANGE: 0
JOINT SWELLING: 0
CHILLS: 0
HEADACHES: 0
OCCASIONAL FEELINGS OF UNSTEADINESS: 0

## 2024-11-15 ASSESSMENT — ACTIVITIES OF DAILY LIVING (ADL)
DRESSING: INDEPENDENT
BATHING: INDEPENDENT
TAKING_MEDICATION: INDEPENDENT
GROCERY_SHOPPING: INDEPENDENT
DOING_HOUSEWORK: INDEPENDENT
MANAGING_FINANCES: INDEPENDENT

## 2024-11-15 ASSESSMENT — PATIENT HEALTH QUESTIONNAIRE - PHQ9
2. FEELING DOWN, DEPRESSED OR HOPELESS: NOT AT ALL
SUM OF ALL RESPONSES TO PHQ9 QUESTIONS 1 AND 2: 0
1. LITTLE INTEREST OR PLEASURE IN DOING THINGS: NOT AT ALL

## 2024-11-15 NOTE — ASSESSMENT & PLAN NOTE
Stable on current medication regiment. Will continue to monitor vital signs for subsequent visits. Provider to be notified for any new cardiac concerns

## 2024-11-15 NOTE — ASSESSMENT & PLAN NOTE
Lab Results   Component Value Date    TSH 1.25 05/09/2024     Patient continues on daily levothyroxine. TSH levels ordered today for monitoring purposes

## 2024-11-15 NOTE — ASSESSMENT & PLAN NOTE
Patient declining additional follow-up with counseling assess psychiatry at this time.  Anxiety does not appear to be overly well-controlled but patient states that he has hydroxyzine that he can use in case of emergencies.  Will continue to reassess patient's mood and need for additional psychiatric support

## 2024-11-15 NOTE — ASSESSMENT & PLAN NOTE
Minimal coronary artery calcification on low-dose CT scan from June, 2024.  Patient reports previous workups from cardiology in the past and declines need for additional workup at this time

## 2024-11-15 NOTE — PATIENT INSTRUCTIONS
Orders in place for fasting blood work to be completed. Please do not eat or drink anything for at least 8 hours prior to having this testing completed. You may have your blood work completed in this building through [facility] ([address], [place] [address]).  Hours:   Monday - Friday: 7:30 a.m. - 5 p.m. and Saturday: 8 a.m. - 12 p.m.  Phone:  [phone]     Please arrange for routine follow up in 6 months

## 2024-11-15 NOTE — ASSESSMENT & PLAN NOTE
Patient to continue with comfort measures and supportive care.  Orthopedic follow-up as needed.  Provider to be notified for any persistent/worsening pain concerns

## 2024-11-15 NOTE — ASSESSMENT & PLAN NOTE
Routine blood work ordered today for additional assessment purposes  -Most recent colonoscopy completed in 2022 with recommendations for re-screening in 5 years (2027)

## 2024-11-15 NOTE — PROGRESS NOTES
"Subjective   Patient ID: Evan Wang is a 64 y.o. male who presents for Welcome To Medicare (Evan is here today for Welcome to Medicare visit , yearly physical.  Pt would like to discuss his B/P as some recent readings he had done were slightly high. ).    Patient seen today for a Welcome to Medicare visit/annual physical exam.  Discussed with patient his mood as he appeared to be overwhelmed at his last visit as he recently retired from being a  and also found out his wife had oral cancer.  Patient continues to voice some feelings of being overwhelmed but does report good support system overall.  He states that he has trialed the hydroxyzine but \"it knocked me out \".  Patient is not interested in pursuing any additional medications or other interventions regarding mood at this time as he would like to focus on his wife and her treatment plans. Patient has two children at home and is his wife's primary caregiver.    Discussed most recent emergency department visit last month for foreign body object in eye.  Per hospital records, \"Patient was seen and evaluated for eye problem. Found to have eye foreign body and after removal with cotton swab small corneal abrasion. Was given antibiotic ointment here and prescription to go home with. Patient is well appearing. They were given abx eye ointment and will be discharged home with follow-up with ophthalmology\".  Patient reports that he never followed up with eye specialist as the symptoms were improved.  He denies any current issues with eye pain or other discomfort.  No other visual disturbances reported.  No reported issues with appetite, staying hydrated or bowel. Patient states that he occasionally smokes marijuana to help with his mood, but states that this is done relatively infrequently.  No reported issues with shortness of breath, cough or wheezing.  Occasional nocturia and reported but this typically less than twice at night.  No reported issue " with bowel movements.  His most recent colonoscopy was completed in 2022.  Patient takes medication for blood pressure and does not monitor his vital signs at home.  He admits to occasional issues of blurred vision when he gets very upset.  He denies any associated shortness of breath or chest pain.  Patient no longer smokes tobacco but admits to drinking several beers a night.     Patient admits to intermittent issues with lower back and rotator cuff discomfort.  Pain.  He states that this was a Worker's Compensation case and was previously receiving tramadol.  Patient states that he has some of this medication left and takes it very sparingly.  He also admits to occasional aches and pains in various joints if he is overdoing things.  He also followed up with podiatry in the past who recommended surgery given chronic concerns following a previous foot injury.  He is not interested in following up again with them at this time.  Medications reviewed.  No other acute concerns voiced at this time.           Past Medical History:   Diagnosis Date    Hypertension         History reviewed. No pertinent surgical history.     Family History   Problem Relation Name Age of Onset    Stomach cancer Mother      Lung cancer Father          Review of Systems   Constitutional:  Negative for chills, fatigue and fever.   HENT:  Positive for hearing loss. Negative for dental problem and trouble swallowing.    Eyes:  Positive for visual disturbance. Negative for pain.        Positive for intermittent blurred vision   Respiratory:  Negative for cough, shortness of breath and wheezing.    Cardiovascular:  Negative for chest pain, palpitations and leg swelling.   Gastrointestinal:  Negative for abdominal distention, abdominal pain, constipation, diarrhea and nausea.   Endocrine: Negative for cold intolerance and heat intolerance.   Genitourinary:  Negative for difficulty urinating.        Positive for occasional nocturia  "  Musculoskeletal:  Positive for arthralgias and back pain. Negative for gait problem, joint swelling and myalgias.        Positive for polyarthralgias   Skin:  Negative for color change, pallor, rash and wound.   Allergic/Immunologic: Negative for environmental allergies and food allergies.   Neurological:  Positive for dizziness. Negative for seizures, weakness and headaches.        Positive for occasional dizziness when standing   Hematological:  Negative for adenopathy. Does not bruise/bleed easily.   Psychiatric/Behavioral:  Negative for behavioral problems.         Positive for anxiety and occasional insomnia   All other systems reviewed and are negative.      Objective   /78 (BP Location: Left arm, Patient Position: Sitting)   Pulse 78   Temp 36.3 °C (97.4 °F) (Oral)   Ht 1.854 m (6' 1\")   Wt 92.5 kg (204 lb)   SpO2 98%   BMI 26.91 kg/m²     Physical Exam  Constitutional:       General: He is not in acute distress.     Appearance: He is not toxic-appearing.   HENT:      Head: Normocephalic and atraumatic.      Right Ear: Tympanic membrane, ear canal and external ear normal.      Left Ear: Tympanic membrane, ear canal and external ear normal.      Nose: Nose normal.      Mouth/Throat:      Mouth: Mucous membranes are moist.      Pharynx: Oropharynx is clear.   Eyes:      Extraocular Movements: Extraocular movements intact.      Conjunctiva/sclera: Conjunctivae normal.      Pupils: Pupils are equal, round, and reactive to light.   Cardiovascular:      Rate and Rhythm: Normal rate and regular rhythm.      Pulses: Normal pulses.      Heart sounds: Normal heart sounds. No murmur heard.  Pulmonary:      Effort: Pulmonary effort is normal.      Breath sounds: Normal breath sounds. No wheezing.   Abdominal:      General: Bowel sounds are normal.      Palpations: Abdomen is soft.   Musculoskeletal:         General: No swelling.      Cervical back: Normal range of motion and neck supple.   Skin:     " "General: Skin is warm and dry.   Neurological:      General: No focal deficit present.      Mental Status: He is alert. Mental status is at baseline.      Cranial Nerves: No cranial nerve deficit.      Motor: No weakness.   Psychiatric:         Behavior: Behavior normal.         Thought Content: Thought content normal.         Judgment: Judgment normal.         Assessment/Plan   Problem List Items Addressed This Visit             ICD-10-CM    Moderate tobacco use disorder, in sustained remission F17.201     Patient is a former smoker and former . Low dose CT Lung scan completed in May, 2024 for screening purposes showed, \"1. No suspicious pulmonary nodules identified. Continued screening with low-dose noncontrast chest CT in 12 months (from current date) is recommended. 2. Mild upper lung emphysema 3. Minimal coronary artery calcification. 4. Diffuse hepatic steatosis. Consider PCP evaluation\".  Plan on annual testing in June         Anxiety F41.9     Patient declining additional follow-up with counseling assess psychiatry at this time.  Anxiety does not appear to be overly well-controlled but patient states that he has hydroxyzine that he can use in case of emergencies.  Will continue to reassess patient's mood and need for additional psychiatric support         Vitamin D deficiency E55.9     Patient admits not taking vitamin D supplement routinely.  Orders placed for reassessment purposes.         Relevant Orders    Vitamin D 25-Hydroxy,Total (for eval of Vitamin D levels)    Prediabetes R73.03     Lab Results   Component Value Date    HGBA1C 5.4 05/09/2024      A1C ordered today for monitoring purposes.         Relevant Orders    CBC and Auto Differential    Comprehensive Metabolic Panel    Hemoglobin A1C    Hypothyroidism E03.9     Lab Results   Component Value Date    TSH 1.25 05/09/2024     Patient continues on daily levothyroxine. TSH levels ordered today for monitoring purposes         Primary " hypertension I10     Stable on current medication regiment. Will continue to monitor vital signs for subsequent visits. Provider to be notified for any new cardiac concerns         Relevant Orders    ECG 12 lead (Clinic Performed) (Completed)    Mixed hyperlipidemia E78.2     Patient now on routine fish oil.  Orders placed today for reassessment of fasting lipid panel.         Coronary artery disease without angina pectoris I25.10     Minimal coronary artery calcification on low-dose CT scan from June, 2024.  Patient reports previous workups from cardiology in the past and declines need for additional workup at this time         Routine adult health maintenance - Primary Z00.00     Routine blood work ordered today for additional assessment purposes  -Most recent colonoscopy completed in 2022 with recommendations for re-screening in 5 years (2027)         Relevant Orders    CBC and Auto Differential    Comprehensive Metabolic Panel    Vitamin D 25-Hydroxy,Total (for eval of Vitamin D levels)    Prostate Specific Antigen    Hemoglobin A1C    TSH with reflex to Free T4 if abnormal    ECG 12 lead (Clinic Performed) (Completed)    Polyarthralgia M25.50     Patient to continue with comfort measures and supportive care.  Orthopedic follow-up as needed.  Provider to be notified for any persistent/worsening pain concerns          Other Visit Diagnoses         Codes    Routine general medical examination at health care facility     Z00.00

## 2024-11-15 NOTE — ASSESSMENT & PLAN NOTE
Lab Results   Component Value Date    HGBA1C 5.4 05/09/2024      A1C ordered today for monitoring purposes.

## 2024-11-15 NOTE — ASSESSMENT & PLAN NOTE
Patient admits not taking vitamin D supplement routinely.  Orders placed for reassessment purposes.

## 2024-11-15 NOTE — ASSESSMENT & PLAN NOTE
"Patient is a former smoker and former . Low dose CT Lung scan completed in May, 2024 for screening purposes showed, \"1. No suspicious pulmonary nodules identified. Continued screening with low-dose noncontrast chest CT in 12 months (from current date) is recommended. 2. Mild upper lung emphysema 3. Minimal coronary artery calcification. 4. Diffuse hepatic steatosis. Consider PCP evaluation\".  Plan on annual testing in June  "

## 2024-12-09 ENCOUNTER — LAB (OUTPATIENT)
Dept: LAB | Facility: LAB | Age: 64
End: 2024-12-09
Payer: MEDICARE

## 2024-12-09 DIAGNOSIS — Z00.00 ROUTINE ADULT HEALTH MAINTENANCE: ICD-10-CM

## 2024-12-09 DIAGNOSIS — E55.9 VITAMIN D DEFICIENCY: ICD-10-CM

## 2024-12-09 DIAGNOSIS — R74.8 ELEVATED LIVER ENZYMES: Primary | ICD-10-CM

## 2024-12-09 DIAGNOSIS — R73.03 PREDIABETES: ICD-10-CM

## 2024-12-09 DIAGNOSIS — E78.2 MIXED HYPERLIPIDEMIA: ICD-10-CM

## 2024-12-09 LAB
25(OH)D3 SERPL-MCNC: 32 NG/ML (ref 30–100)
ALBUMIN SERPL BCP-MCNC: 4.4 G/DL (ref 3.4–5)
ALP SERPL-CCNC: 40 U/L (ref 33–136)
ALT SERPL W P-5'-P-CCNC: 83 U/L (ref 10–52)
ANION GAP SERPL CALC-SCNC: 12 MMOL/L (ref 10–20)
AST SERPL W P-5'-P-CCNC: 45 U/L (ref 9–39)
BASOPHILS # BLD AUTO: 0.02 X10*3/UL (ref 0–0.1)
BASOPHILS NFR BLD AUTO: 0.4 %
BILIRUB SERPL-MCNC: 0.9 MG/DL (ref 0–1.2)
BUN SERPL-MCNC: 17 MG/DL (ref 6–23)
CALCIUM SERPL-MCNC: 9.1 MG/DL (ref 8.6–10.3)
CHLORIDE SERPL-SCNC: 104 MMOL/L (ref 98–107)
CHOLEST SERPL-MCNC: 233 MG/DL (ref 0–199)
CHOLESTEROL/HDL RATIO: 5.3
CO2 SERPL-SCNC: 28 MMOL/L (ref 21–32)
CREAT SERPL-MCNC: 0.78 MG/DL (ref 0.5–1.3)
EGFRCR SERPLBLD CKD-EPI 2021: >90 ML/MIN/1.73M*2
EOSINOPHIL # BLD AUTO: 0.12 X10*3/UL (ref 0–0.7)
EOSINOPHIL NFR BLD AUTO: 2.5 %
ERYTHROCYTE [DISTWIDTH] IN BLOOD BY AUTOMATED COUNT: 12 % (ref 11.5–14.5)
EST. AVERAGE GLUCOSE BLD GHB EST-MCNC: 114 MG/DL
GLUCOSE SERPL-MCNC: 113 MG/DL (ref 74–99)
HBA1C MFR BLD: 5.6 %
HCT VFR BLD AUTO: 48.5 % (ref 41–52)
HDLC SERPL-MCNC: 44.3 MG/DL
HGB BLD-MCNC: 16.1 G/DL (ref 13.5–17.5)
IMM GRANULOCYTES # BLD AUTO: 0.02 X10*3/UL (ref 0–0.7)
IMM GRANULOCYTES NFR BLD AUTO: 0.4 % (ref 0–0.9)
LDLC SERPL CALC-MCNC: 147 MG/DL
LYMPHOCYTES # BLD AUTO: 1.75 X10*3/UL (ref 1.2–4.8)
LYMPHOCYTES NFR BLD AUTO: 36.9 %
MCH RBC QN AUTO: 32.6 PG (ref 26–34)
MCHC RBC AUTO-ENTMCNC: 33.2 G/DL (ref 32–36)
MCV RBC AUTO: 98 FL (ref 80–100)
MONOCYTES # BLD AUTO: 0.44 X10*3/UL (ref 0.1–1)
MONOCYTES NFR BLD AUTO: 9.3 %
NEUTROPHILS # BLD AUTO: 2.39 X10*3/UL (ref 1.2–7.7)
NEUTROPHILS NFR BLD AUTO: 50.5 %
NON HDL CHOLESTEROL: 189 MG/DL (ref 0–149)
NRBC BLD-RTO: 0 /100 WBCS (ref 0–0)
PLATELET # BLD AUTO: 172 X10*3/UL (ref 150–450)
POTASSIUM SERPL-SCNC: 4.3 MMOL/L (ref 3.5–5.3)
PROT SERPL-MCNC: 6.3 G/DL (ref 6.4–8.2)
PSA SERPL-MCNC: 1.41 NG/ML
RBC # BLD AUTO: 4.94 X10*6/UL (ref 4.5–5.9)
SODIUM SERPL-SCNC: 140 MMOL/L (ref 136–145)
TRIGL SERPL-MCNC: 210 MG/DL (ref 0–149)
TSH SERPL-ACNC: 2.99 MIU/L (ref 0.44–3.98)
VLDL: 42 MG/DL (ref 0–40)
WBC # BLD AUTO: 4.7 X10*3/UL (ref 4.4–11.3)

## 2024-12-09 PROCEDURE — 85025 COMPLETE CBC W/AUTO DIFF WBC: CPT

## 2024-12-09 PROCEDURE — 83036 HEMOGLOBIN GLYCOSYLATED A1C: CPT

## 2024-12-09 PROCEDURE — 80061 LIPID PANEL: CPT

## 2024-12-09 PROCEDURE — 82306 VITAMIN D 25 HYDROXY: CPT

## 2024-12-09 PROCEDURE — 84153 ASSAY OF PSA TOTAL: CPT

## 2024-12-09 PROCEDURE — 36415 COLL VENOUS BLD VENIPUNCTURE: CPT

## 2024-12-09 PROCEDURE — 84443 ASSAY THYROID STIM HORMONE: CPT

## 2024-12-09 PROCEDURE — 80053 COMPREHEN METABOLIC PANEL: CPT

## 2025-01-16 ENCOUNTER — APPOINTMENT (OUTPATIENT)
Dept: PRIMARY CARE | Facility: CLINIC | Age: 65
End: 2025-01-16
Payer: MEDICARE

## 2025-01-29 ENCOUNTER — APPOINTMENT (OUTPATIENT)
Dept: PRIMARY CARE | Facility: CLINIC | Age: 65
End: 2025-01-29
Payer: MEDICARE

## 2025-01-31 PROCEDURE — 99493 SBSQ PSYC COLLAB CARE MGMT: CPT | Performed by: NURSE PRACTITIONER

## 2025-02-04 NOTE — PROGRESS NOTES
"Collaborative Care ([facility])  Progress Note    Type of Interaction: In Office    Start Time: 1100    End Time: 1200pm        Appointment: Scheduled    Reason for Visit:   Chief Complaint   Patient presents with    Anxiety    Only second time seeing See, initial assessment months ago. His wife is done with the majority of her cancer treatments, but is still not doing great. Today, See c/o struggling with being able to \"let things go\", hanging on to anger and frustrations, and the difficulty with how differently he does things that his family members. States he would like to \"stay in control\" of himself when dealing with difficult issues. He denies any significant depression, is using his music as stress release. Taught basic square breathing technique today.       Interval History / Patient Symptoms:     No data recorded      Interventions Provided: Psychoeducation, Values Exploration, and Develop Coping Strategies      Progress Made: Minimum    Response to Intervention: Engaged, speech tangential/rapid at times        Plan:     Care Plan    There is no care plan documentation to display.         There are no Patient Instructions on file for this visit.      Follow Up / Next Appointment:    1 month    "

## 2025-02-09 ENCOUNTER — DOCUMENTATION WITH CHARGES (OUTPATIENT)
Dept: PRIMARY CARE | Facility: CLINIC | Age: 65
End: 2025-02-09
Payer: MEDICARE

## 2025-02-09 DIAGNOSIS — F41.9 ANXIETY: Primary | ICD-10-CM

## 2025-02-26 ENCOUNTER — APPOINTMENT (OUTPATIENT)
Dept: PRIMARY CARE | Facility: CLINIC | Age: 65
End: 2025-02-26
Payer: MEDICARE

## 2025-03-03 NOTE — PROGRESS NOTES
"[facility] ([facility])  Progress Note    Type of Interaction: In Office    Start Time: 1100    End Time: 200        Appointment: Scheduled    Reason for Visit:   Chief Complaint   Patient presents with    Anxiety    See reports toda that he has \"dropped the ball\" on practicing breathing exercises. Continues to struggle with wife/kids due to differences in communication styles, but still wants to work on \"resetting the energy that is not helpful\". Introduced basic CBT concepts and he is interested in pursuing this further. Also discussed/practiced mindfulness meditatin.      Interval History / Patient Symptoms:     No data recorded      Interventions Provided: Psychoeducation, Acceptance & Commitment Therapy, Motivational Interviewing, Values Exploration, Develop Coping Strategies, Review Progress/Goals Stress Management, Mindfulness, and CBT      Progress Made: Minimum    Response to Intervention: See does report improved sleep now. He is no longer taking hydroxyzine PRN, feels it makes him too drowsy.         Plan:     Care Plan    There is no care plan documentation to display.         There are no Patient Instructions on file for this visit.    Follow Up / Next Appointment:  1 month      "

## 2025-03-08 ENCOUNTER — DOCUMENTATION WITH CHARGES (OUTPATIENT)
Dept: PRIMARY CARE | Facility: CLINIC | Age: 65
End: 2025-03-08
Payer: MEDICARE

## 2025-03-08 DIAGNOSIS — F41.9 ANXIETY: Primary | ICD-10-CM

## 2025-03-26 ENCOUNTER — APPOINTMENT (OUTPATIENT)
Dept: PRIMARY CARE | Facility: CLINIC | Age: 65
End: 2025-03-26
Payer: MEDICARE

## 2025-03-30 NOTE — PROGRESS NOTES
"Collaborative Care (CoCM)  Progress Note    Type of Interaction: In Office    Start Time: 1100    End Time: 1200        Appointment: Scheduled    Reason for Visit:   Chief Complaint   Patient presents with    Anxiety    Goal of today's session was introducing and explaining basic CBT concepts, using examples from his own life to assist him to put into practice. She continues to struggle with communication and value differences between himself and wife/kids      Interval History / Patient Symptoms:     No data recorded      Interventions Provided: Psychoeducation and CBT      Progress Made: Minimum    Response to Intervention: At times, struggled with CBT concepts and feeling that they create a \"conflict\", has some beliefs about human nature that are interfering a bit with him working at the everyday level of CBT, but would still like to return and continue to try        Plan:     Care Plan    There is no care plan documentation to display.         There are no Patient Instructions on file for this visit.      Follow Up / Next Appointment:  1 month      "

## 2025-03-31 PROCEDURE — 99493 SBSQ PSYC COLLAB CARE MGMT: CPT | Performed by: NURSE PRACTITIONER

## 2025-04-02 ENCOUNTER — DOCUMENTATION WITH CHARGES (OUTPATIENT)
Dept: PRIMARY CARE | Facility: CLINIC | Age: 65
End: 2025-04-02
Payer: MEDICARE

## 2025-04-02 DIAGNOSIS — F43.20 ADJUSTMENT DISORDER, UNSPECIFIED TYPE: Primary | ICD-10-CM

## 2025-04-30 ENCOUNTER — APPOINTMENT (OUTPATIENT)
Dept: PRIMARY CARE | Facility: CLINIC | Age: 65
End: 2025-04-30
Payer: MEDICARE

## 2025-05-01 DIAGNOSIS — I10 PRIMARY HYPERTENSION: ICD-10-CM

## 2025-05-01 RX ORDER — LISINOPRIL 20 MG/1
20 TABLET ORAL DAILY
Qty: 90 TABLET | Refills: 0 | Status: SHIPPED | OUTPATIENT
Start: 2025-05-01

## 2025-06-19 DIAGNOSIS — E03.9 HYPOTHYROIDISM, UNSPECIFIED TYPE: ICD-10-CM

## 2025-06-19 RX ORDER — LEVOTHYROXINE SODIUM 88 UG/1
TABLET ORAL
Qty: 118 TABLET | Refills: 0 | Status: SHIPPED | OUTPATIENT
Start: 2025-06-19 | End: 2025-09-17

## 2025-06-30 ENCOUNTER — APPOINTMENT (OUTPATIENT)
Dept: PRIMARY CARE | Facility: CLINIC | Age: 65
End: 2025-06-30
Payer: MEDICARE

## 2025-06-30 VITALS
WEIGHT: 204 LBS | BODY MASS INDEX: 27.04 KG/M2 | OXYGEN SATURATION: 99 % | SYSTOLIC BLOOD PRESSURE: 130 MMHG | HEIGHT: 73 IN | HEART RATE: 76 BPM | DIASTOLIC BLOOD PRESSURE: 82 MMHG

## 2025-06-30 DIAGNOSIS — E78.2 MIXED HYPERLIPIDEMIA: ICD-10-CM

## 2025-06-30 DIAGNOSIS — I25.10 CORONARY ARTERY DISEASE INVOLVING NATIVE CORONARY ARTERY OF NATIVE HEART WITHOUT ANGINA PECTORIS: ICD-10-CM

## 2025-06-30 DIAGNOSIS — Z87.891 FORMER SMOKER: ICD-10-CM

## 2025-06-30 DIAGNOSIS — M54.50 CHRONIC LOW BACK PAIN, UNSPECIFIED BACK PAIN LATERALITY, UNSPECIFIED WHETHER SCIATICA PRESENT: ICD-10-CM

## 2025-06-30 DIAGNOSIS — G89.29 CHRONIC LOW BACK PAIN, UNSPECIFIED BACK PAIN LATERALITY, UNSPECIFIED WHETHER SCIATICA PRESENT: ICD-10-CM

## 2025-06-30 DIAGNOSIS — Z00.00 ROUTINE GENERAL MEDICAL EXAMINATION AT HEALTH CARE FACILITY: Primary | ICD-10-CM

## 2025-06-30 DIAGNOSIS — I10 PRIMARY HYPERTENSION: ICD-10-CM

## 2025-06-30 DIAGNOSIS — Z13.220 SCREENING FOR CHOLESTEROL LEVEL: ICD-10-CM

## 2025-06-30 DIAGNOSIS — R42 DIZZINESS: ICD-10-CM

## 2025-06-30 DIAGNOSIS — F17.201 MODERATE TOBACCO USE DISORDER, IN SUSTAINED REMISSION: ICD-10-CM

## 2025-06-30 DIAGNOSIS — R73.03 PREDIABETES: ICD-10-CM

## 2025-06-30 DIAGNOSIS — Z00.00 ROUTINE ADULT HEALTH MAINTENANCE: ICD-10-CM

## 2025-06-30 DIAGNOSIS — Z13.6 ENCOUNTER FOR LIPID SCREENING FOR CARDIOVASCULAR DISEASE: ICD-10-CM

## 2025-06-30 DIAGNOSIS — F41.9 ANXIETY: ICD-10-CM

## 2025-06-30 DIAGNOSIS — E03.9 HYPOTHYROIDISM, UNSPECIFIED TYPE: ICD-10-CM

## 2025-06-30 DIAGNOSIS — E87.5 HYPERKALEMIA: ICD-10-CM

## 2025-06-30 DIAGNOSIS — Z13.220 ENCOUNTER FOR LIPID SCREENING FOR CARDIOVASCULAR DISEASE: ICD-10-CM

## 2025-06-30 PROBLEM — R53.83 FATIGUE: Status: RESOLVED | Noted: 2018-05-08 | Resolved: 2025-06-30

## 2025-06-30 PROBLEM — R94.39 ABNORMAL STRESS ECHO: Status: RESOLVED | Noted: 2019-10-16 | Resolved: 2025-06-30

## 2025-06-30 PROBLEM — R07.89 CHEST DISCOMFORT: Status: RESOLVED | Noted: 2019-10-02 | Resolved: 2025-06-30

## 2025-06-30 PROCEDURE — 3079F DIAST BP 80-89 MM HG: CPT | Performed by: NURSE PRACTITIONER

## 2025-06-30 PROCEDURE — 3008F BODY MASS INDEX DOCD: CPT | Performed by: NURSE PRACTITIONER

## 2025-06-30 PROCEDURE — 1159F MED LIST DOCD IN RCRD: CPT | Performed by: NURSE PRACTITIONER

## 2025-06-30 PROCEDURE — 1036F TOBACCO NON-USER: CPT | Performed by: NURSE PRACTITIONER

## 2025-06-30 PROCEDURE — 1170F FXNL STATUS ASSESSED: CPT | Performed by: NURSE PRACTITIONER

## 2025-06-30 PROCEDURE — 3075F SYST BP GE 130 - 139MM HG: CPT | Performed by: NURSE PRACTITIONER

## 2025-06-30 PROCEDURE — G0439 PPPS, SUBSEQ VISIT: HCPCS | Performed by: NURSE PRACTITIONER

## 2025-06-30 PROCEDURE — 99214 OFFICE O/P EST MOD 30 MIN: CPT | Performed by: NURSE PRACTITIONER

## 2025-06-30 PROCEDURE — 1123F ACP DISCUSS/DSCN MKR DOCD: CPT | Performed by: NURSE PRACTITIONER

## 2025-06-30 PROCEDURE — 1158F ADVNC CARE PLAN TLK DOCD: CPT | Performed by: NURSE PRACTITIONER

## 2025-06-30 RX ORDER — LISINOPRIL 20 MG/1
20 TABLET ORAL DAILY
Qty: 90 TABLET | Refills: 1 | Status: SHIPPED | OUTPATIENT
Start: 2025-06-30 | End: 2025-12-27

## 2025-06-30 ASSESSMENT — ENCOUNTER SYMPTOMS
WOUND: 0
CONSTIPATION: 0
BRUISES/BLEEDS EASILY: 0
ABDOMINAL DISTENTION: 0
OCCASIONAL FEELINGS OF UNSTEADINESS: 0
ABDOMINAL PAIN: 0
EYE PAIN: 0
NAUSEA: 0
DEPRESSION: 0
LOSS OF SENSATION IN FEET: 0
SEIZURES: 0
BACK PAIN: 1
COLOR CHANGE: 0
COUGH: 0
PALPITATIONS: 0
ARTHRALGIAS: 1
SHORTNESS OF BREATH: 0
CHILLS: 0
FATIGUE: 0
WHEEZING: 0
HEADACHES: 0
DIZZINESS: 1
MYALGIAS: 0
WEAKNESS: 0
JOINT SWELLING: 0
DIARRHEA: 0
TROUBLE SWALLOWING: 0
DIFFICULTY URINATING: 0
FEVER: 0
ADENOPATHY: 0

## 2025-06-30 ASSESSMENT — COLUMBIA-SUICIDE SEVERITY RATING SCALE - C-SSRS
2. HAVE YOU ACTUALLY HAD ANY THOUGHTS OF KILLING YOURSELF?: NO
1. IN THE PAST MONTH, HAVE YOU WISHED YOU WERE DEAD OR WISHED YOU COULD GO TO SLEEP AND NOT WAKE UP?: NO
6. HAVE YOU EVER DONE ANYTHING, STARTED TO DO ANYTHING, OR PREPARED TO DO ANYTHING TO END YOUR LIFE?: NO

## 2025-06-30 ASSESSMENT — ACTIVITIES OF DAILY LIVING (ADL)
BATHING: INDEPENDENT
GROCERY_SHOPPING: NEEDS ASSISTANCE
MANAGING_FINANCES: NEEDS ASSISTANCE
DOING_HOUSEWORK: NEEDS ASSISTANCE
TAKING_MEDICATION: NEEDS ASSISTANCE
DRESSING: INDEPENDENT

## 2025-06-30 ASSESSMENT — PATIENT HEALTH QUESTIONNAIRE - PHQ9
SUM OF ALL RESPONSES TO PHQ9 QUESTIONS 1 AND 2: 0
1. LITTLE INTEREST OR PLEASURE IN DOING THINGS: NOT AT ALL
2. FEELING DOWN, DEPRESSED OR HOPELESS: NOT AT ALL

## 2025-06-30 NOTE — ASSESSMENT & PLAN NOTE
Lab Results   Component Value Date    HGBA1C 5.6 12/09/2024      A1C ordered today for monitoring purposes.

## 2025-06-30 NOTE — PROGRESS NOTES
"Subjective   Patient ID: Evan Wang is a 65 y.o. male who presents for Follow-up and Medicare Annual Wellness Visit Subsequent.    Patient seen today in order to complete an annual Medicare wellness exam.  Patient recently retired from being a  and continues to live with his wife and children.  Patient has seemed counseling services in the past for anxiety/adjustment disorder.  Mood appears to be overall \"okay \"and he denies the need for any additional counseling services or medication at this time. He states that he has trialed the hydroxyzine but \"it knocked me out \".  Good support system reported overall.  Patient's wife was diagnosed last year with oral cancer.  No reported issues with appetite, staying hydrated or bowel. Patient states that he occasionally smokes marijuana to help with his mood, but states that this is done relatively infrequently.  No reported issues with shortness of breath, cough or wheezing.  Occasional nocturia and reported but this typically less than twice at night.  No reported issue with bowel movements.  His most recent colonoscopy was completed in 2022.  Patient takes medication for blood pressure and does not monitor his vital signs at home.  He admits to occasional issues of blurred vision and states that he feels like it is age-related.  Patient reports that he plans on following up with an eye specialist in the near future.  No reported issues with shortness of breath or chest pain.  Patient no longer smokes tobacco but admits to drinking several beers a night.     Patient admits to intermittent issues with lower back and rotator cuff discomfort.  He states that this was a Worker's Compensation case has been closed and was previously receiving tramadol.  Patient states that he has some of this medication left and takes it very sparingly.  He also admits to occasional aches and pains in various joints if he is overdoing things.  He also followed up with podiatry in " "the past who recommended surgery given chronic concerns following a previous foot injury.  He is not interested in following up again with them at this time.  Medications reviewed.  No other acute concerns voiced at this time.           Past Medical History:   Diagnosis Date    Hypertension         History reviewed. No pertinent surgical history.     Family History   Problem Relation Name Age of Onset    Stomach cancer Mother      Lung cancer Father          Review of Systems   Constitutional:  Negative for chills, fatigue and fever.   HENT:  Positive for hearing loss. Negative for dental problem and trouble swallowing.    Eyes:  Positive for visual disturbance. Negative for pain.        Positive for intermittent blurred vision   Respiratory:  Negative for cough, shortness of breath and wheezing.    Cardiovascular:  Negative for chest pain, palpitations and leg swelling.   Gastrointestinal:  Negative for abdominal distention, abdominal pain, constipation, diarrhea and nausea.   Endocrine: Negative for cold intolerance and heat intolerance.   Genitourinary:  Negative for difficulty urinating.        Positive for occasional nocturia   Musculoskeletal:  Positive for arthralgias and back pain. Negative for gait problem, joint swelling and myalgias.        Positive for polyarthralgias   Skin:  Negative for color change, pallor, rash and wound.   Allergic/Immunologic: Negative for environmental allergies and food allergies.   Neurological:  Positive for dizziness. Negative for seizures, weakness and headaches.        Positive for occasional dizziness when standing   Hematological:  Negative for adenopathy. Does not bruise/bleed easily.   Psychiatric/Behavioral:  Negative for behavioral problems.         Positive for anxiety and occasional insomnia   All other systems reviewed and are negative.      Objective   /82   Pulse 76   Ht 1.854 m (6' 1\")   Wt 92.5 kg (204 lb)   SpO2 99%   BMI 26.91 kg/m²     Physical " Exam  Constitutional:       General: He is not in acute distress.     Appearance: He is not toxic-appearing.   HENT:      Head: Normocephalic and atraumatic.      Right Ear: Tympanic membrane, ear canal and external ear normal.      Left Ear: Ear canal and external ear normal.      Ears:      Comments: History of punctured TM to left side     Nose: Nose normal.      Mouth/Throat:      Mouth: Mucous membranes are moist.      Pharynx: Oropharynx is clear.   Eyes:      Extraocular Movements: Extraocular movements intact.      Conjunctiva/sclera: Conjunctivae normal.      Pupils: Pupils are equal, round, and reactive to light.   Cardiovascular:      Rate and Rhythm: Normal rate and regular rhythm.      Pulses: Normal pulses.      Heart sounds: Normal heart sounds. No murmur heard.  Pulmonary:      Effort: Pulmonary effort is normal.      Breath sounds: Normal breath sounds. No wheezing.   Abdominal:      General: Bowel sounds are normal.      Palpations: Abdomen is soft.   Musculoskeletal:         General: No swelling.      Cervical back: Normal range of motion and neck supple.   Skin:     General: Skin is warm and dry.   Neurological:      General: No focal deficit present.      Mental Status: He is alert. Mental status is at baseline.      Cranial Nerves: No cranial nerve deficit.      Motor: No weakness.   Psychiatric:         Behavior: Behavior normal.         Thought Content: Thought content normal.         Judgment: Judgment normal.         Assessment/Plan   Problem List Items Addressed This Visit           ICD-10-CM    Moderate tobacco use disorder, in sustained remission F17.201    Patient is a former smoker and former . Low dose CT Lung scan reordered today for surveillance purposes based on annual recommendation         Relevant Orders    Vascular US Carotid Artery Duplex Bilateral    Anxiety F41.9    Patient declining additional follow-up with counseling assess psychiatry at this time.  Anxiety  does not appear to be overly well-controlled but patient states that he has hydroxyzine that he can use in case of emergencies.  Will continue to reassess patient's mood and need for additional psychiatric support.  Good support system reported at home         Prediabetes R73.03    Lab Results   Component Value Date    HGBA1C 5.6 12/09/2024      A1C ordered today for monitoring purposes.         Relevant Orders    Vascular US Carotid Artery Duplex Bilateral    Hypothyroidism E03.9    Lab Results   Component Value Date    TSH 2.99 12/09/2024     Patient continues on daily levothyroxine. TSH levels ordered today for monitoring purposes         Primary hypertension I10    Stable on current medication regiment.  Patient encouraged to monitor blood pressures routinely at home and notify provider for any persistent elevations or associated cardiac symptoms.  Will continue to monitor vital signs for subsequent visits. Provider to be notified for any new cardiac concerns         Relevant Medications    lisinopril 20 mg tablet    Other Relevant Orders    Comprehensive Metabolic Panel    Lipid Panel    CBC and Auto Differential    Vascular US Carotid Artery Duplex Bilateral    Chronic back pain M54.9, G89.29    Consider physical therapy for any flares.  Comfort measures and supportive care at this time.         Mixed hyperlipidemia E78.2    Patient states he is not interested in starting any statin therapy or any other prescriptions that are geared to lower cholesterol levels.  He is also declining the need to follow-up with a nutritionist.  Strongly encouraged increase diet modifications and increase physical activity         Relevant Orders    Vascular US Carotid Artery Duplex Bilateral    Coronary artery disease without angina pectoris I25.10    Minimal coronary artery calcification on low-dose CT scan from June, 2024.  Patient reports previous workups from cardiology in the past and declines need for additional workup at  this time         Routine adult health maintenance Z00.00    Routine blood work ordered today for additional assessment purposes  -Most recent colonoscopy completed in 2022 with recommendations for re-screening in 5 years (2027)         Relevant Orders    Comprehensive Metabolic Panel    Lipid Panel    CBC and Auto Differential     Other Visit Diagnoses         Codes      Routine general medical examination at health care facility    -  Primary Z00.00    Relevant Orders    1 Year Follow Up In Primary Care - Wellness Exam      Screening for cholesterol level     Z13.220    Relevant Orders    Lipid Panel      Encounter for lipid screening for cardiovascular disease     Z13.220, Z13.6    Relevant Orders    Lipid Panel      Former smoker     Z87.891    Relevant Orders    CT lung screening low dose    Vascular US Carotid Artery Duplex Bilateral      Dizziness     R42    Relevant Orders    Vascular US Carotid Artery Duplex Bilateral

## 2025-06-30 NOTE — ASSESSMENT & PLAN NOTE
Lab Results   Component Value Date    TSH 2.99 12/09/2024     Patient continues on daily levothyroxine. TSH levels ordered today for monitoring purposes

## 2025-06-30 NOTE — ASSESSMENT & PLAN NOTE
Patient declining additional follow-up with counseling assess psychiatry at this time.  Anxiety does not appear to be overly well-controlled but patient states that he has hydroxyzine that he can use in case of emergencies.  Will continue to reassess patient's mood and need for additional psychiatric support.  Good support system reported at home

## 2025-06-30 NOTE — ASSESSMENT & PLAN NOTE
Patient states he is not interested in starting any statin therapy or any other prescriptions that are geared to lower cholesterol levels.  He is also declining the need to follow-up with a nutritionist.  Strongly encouraged increase diet modifications and increase physical activity

## 2025-06-30 NOTE — ASSESSMENT & PLAN NOTE
Patient is a former smoker and former . Low dose CT Lung scan reordered today for surveillance purposes based on annual recommendation

## 2025-06-30 NOTE — ASSESSMENT & PLAN NOTE
Stable on current medication regiment.  Patient encouraged to monitor blood pressures routinely at home and notify provider for any persistent elevations or associated cardiac symptoms.  Will continue to monitor vital signs for subsequent visits. Provider to be notified for any new cardiac concerns

## 2025-06-30 NOTE — PATIENT INSTRUCTIONS
Please arrange for routine follow up in 6 months. You may schedule on-line through [employer] or call our office at [phone].      Orders are in place for you to have fasting blood work completed. Please do not eat or drink 8 hours prior to having your blood drawn.   You may have your blood work completed in this building through [employer] ([address] [address], [place] [address]).  For this location, you may schedule an appointment online or drop-in for walk-in services. [email or web address]  Hours:   Monday - Friday: 7:30 a.m. - 5 p.m. and Saturday: 8 a.m. - 11 a.m.  Phone:  [phone]      Orders are in place for you to complete imaging for additional assessment purposes (Carotid US and CT of chest).  You can have this completed at [facility].  Please contact the radiology department there to schedule.  The number is [phone]

## 2025-07-01 LAB
ALBUMIN SERPL-MCNC: 4.9 G/DL (ref 3.6–5.1)
ALP SERPL-CCNC: 42 U/L (ref 35–144)
ALT SERPL-CCNC: 39 U/L (ref 9–46)
ANION GAP SERPL CALCULATED.4IONS-SCNC: 8 MMOL/L (CALC) (ref 7–17)
AST SERPL-CCNC: 21 U/L (ref 10–35)
BASOPHILS # BLD AUTO: 11 CELLS/UL (ref 0–200)
BASOPHILS NFR BLD AUTO: 0.2 %
BILIRUB SERPL-MCNC: 1 MG/DL (ref 0.2–1.2)
BUN SERPL-MCNC: 18 MG/DL (ref 7–25)
CALCIUM SERPL-MCNC: 9.8 MG/DL (ref 8.6–10.3)
CHLORIDE SERPL-SCNC: 103 MMOL/L (ref 98–110)
CHOLEST SERPL-MCNC: 222 MG/DL
CHOLEST/HDLC SERPL: 4.6 (CALC)
CO2 SERPL-SCNC: 29 MMOL/L (ref 20–32)
CREAT SERPL-MCNC: 0.75 MG/DL (ref 0.7–1.35)
EGFRCR SERPLBLD CKD-EPI 2021: 100 ML/MIN/1.73M2
EOSINOPHIL # BLD AUTO: 99 CELLS/UL (ref 15–500)
EOSINOPHIL NFR BLD AUTO: 1.8 %
ERYTHROCYTE [DISTWIDTH] IN BLOOD BY AUTOMATED COUNT: 13.1 % (ref 11–15)
GLUCOSE SERPL-MCNC: 99 MG/DL (ref 65–99)
HCT VFR BLD AUTO: 48.3 % (ref 38.5–50)
HDLC SERPL-MCNC: 48 MG/DL
HGB BLD-MCNC: 16 G/DL (ref 13.2–17.1)
LDLC SERPL CALC-MCNC: 141 MG/DL (CALC)
LYMPHOCYTES # BLD AUTO: 1843 CELLS/UL (ref 850–3900)
LYMPHOCYTES NFR BLD AUTO: 33.5 %
MCH RBC QN AUTO: 33.1 PG (ref 27–33)
MCHC RBC AUTO-ENTMCNC: 33.1 G/DL (ref 32–36)
MCV RBC AUTO: 99.8 FL (ref 80–100)
MONOCYTES # BLD AUTO: 528 CELLS/UL (ref 200–950)
MONOCYTES NFR BLD AUTO: 9.6 %
NEUTROPHILS # BLD AUTO: 3020 CELLS/UL (ref 1500–7800)
NEUTROPHILS NFR BLD AUTO: 54.9 %
NONHDLC SERPL-MCNC: 174 MG/DL (CALC)
PLATELET # BLD AUTO: 172 THOUSAND/UL (ref 140–400)
PMV BLD REES-ECKER: 12 FL (ref 7.5–12.5)
POTASSIUM SERPL-SCNC: 5.4 MMOL/L (ref 3.5–5.3)
PROT SERPL-MCNC: 7 G/DL (ref 6.1–8.1)
RBC # BLD AUTO: 4.84 MILLION/UL (ref 4.2–5.8)
SODIUM SERPL-SCNC: 140 MMOL/L (ref 135–146)
TRIGL SERPL-MCNC: 192 MG/DL
WBC # BLD AUTO: 5.5 THOUSAND/UL (ref 3.8–10.8)

## 2025-07-11 ENCOUNTER — HOSPITAL ENCOUNTER (OUTPATIENT)
Dept: VASCULAR MEDICINE | Facility: HOSPITAL | Age: 65
Discharge: HOME | End: 2025-07-11
Payer: MEDICARE

## 2025-07-11 DIAGNOSIS — I10 PRIMARY HYPERTENSION: ICD-10-CM

## 2025-07-11 DIAGNOSIS — Z87.891 FORMER SMOKER: ICD-10-CM

## 2025-07-11 DIAGNOSIS — R42 DIZZINESS: ICD-10-CM

## 2025-07-11 DIAGNOSIS — R73.03 PREDIABETES: ICD-10-CM

## 2025-07-11 DIAGNOSIS — F17.201 MODERATE TOBACCO USE DISORDER, IN SUSTAINED REMISSION: ICD-10-CM

## 2025-07-11 DIAGNOSIS — E78.2 MIXED HYPERLIPIDEMIA: ICD-10-CM

## 2025-07-11 PROCEDURE — 93880 EXTRACRANIAL BILAT STUDY: CPT | Performed by: INTERNAL MEDICINE

## 2025-07-11 PROCEDURE — 93880 EXTRACRANIAL BILAT STUDY: CPT

## 2025-07-15 DIAGNOSIS — E87.5 HYPERKALEMIA: ICD-10-CM
